# Patient Record
Sex: FEMALE | Race: BLACK OR AFRICAN AMERICAN | Employment: FULL TIME | URBAN - METROPOLITAN AREA
[De-identification: names, ages, dates, MRNs, and addresses within clinical notes are randomized per-mention and may not be internally consistent; named-entity substitution may affect disease eponyms.]

---

## 2018-05-23 ENCOUNTER — HOSPITAL ENCOUNTER (OUTPATIENT)
Dept: CT IMAGING | Age: 43
Discharge: HOME | End: 2018-05-23
Attending: FAMILY MEDICINE
Payer: COMMERCIAL

## 2018-05-23 DIAGNOSIS — R10.9 STOMACH ACHE: ICD-10-CM

## 2018-05-23 PROCEDURE — 74011636320 HC RX REV CODE- 636/320

## 2018-05-23 PROCEDURE — 74176 CT ABD & PELVIS W/O CONTRAST: CPT

## 2018-05-23 RX ADMIN — IOPAMIDOL 94 ML: 755 INJECTION, SOLUTION INTRAVENOUS at 13:13

## 2018-08-01 ENCOUNTER — HOSPITAL ENCOUNTER (OUTPATIENT)
Dept: MAMMOGRAPHY | Age: 43
Discharge: HOME | End: 2018-08-01
Attending: OBSTETRICS & GYNECOLOGY
Payer: COMMERCIAL

## 2018-08-01 DIAGNOSIS — Z12.31 VISIT FOR SCREENING MAMMOGRAM: ICD-10-CM

## 2018-08-01 PROCEDURE — 77067 SCR MAMMO BI INCL CAD: CPT

## 2018-08-24 ENCOUNTER — HOSPITAL ENCOUNTER (OUTPATIENT)
Dept: MAMMOGRAPHY | Age: 43
Discharge: HOME | End: 2018-08-24
Attending: OBSTETRICS & GYNECOLOGY
Payer: COMMERCIAL

## 2018-08-24 DIAGNOSIS — R92.8 ABNORMAL MAMMOGRAM: ICD-10-CM

## 2018-08-24 PROCEDURE — 76642 ULTRASOUND BREAST LIMITED: CPT

## 2018-08-24 PROCEDURE — 77065 DX MAMMO INCL CAD UNI: CPT

## 2019-03-06 ENCOUNTER — OFFICE VISIT (OUTPATIENT)
Dept: FAMILY MEDICINE CLINIC | Age: 44
End: 2019-03-06

## 2019-03-06 VITALS
DIASTOLIC BLOOD PRESSURE: 74 MMHG | RESPIRATION RATE: 16 BRPM | SYSTOLIC BLOOD PRESSURE: 118 MMHG | WEIGHT: 187 LBS | HEART RATE: 78 BPM | TEMPERATURE: 97.9 F | HEIGHT: 62 IN | OXYGEN SATURATION: 99 % | BODY MASS INDEX: 34.41 KG/M2

## 2019-03-06 DIAGNOSIS — Z00.00 ROUTINE GENERAL MEDICAL EXAMINATION AT A HEALTH CARE FACILITY: Primary | ICD-10-CM

## 2019-03-06 DIAGNOSIS — S83.91XA SPRAIN OF RIGHT KNEE, UNSPECIFIED LIGAMENT, INITIAL ENCOUNTER: ICD-10-CM

## 2019-03-06 DIAGNOSIS — E66.9 OBESITY (BMI 30.0-34.9): ICD-10-CM

## 2019-03-06 RX ORDER — TRAMADOL HYDROCHLORIDE 50 MG/1
50 TABLET ORAL
COMMUNITY
Start: 2019-03-05 | End: 2019-03-06 | Stop reason: ALTCHOICE

## 2019-03-06 RX ORDER — TRIAMCINOLONE ACETONIDE 1 MG/G
CREAM TOPICAL
COMMUNITY
Start: 2019-02-24 | End: 2019-08-21

## 2019-03-06 RX ORDER — DICLOFENAC SODIUM 50 MG/1
50 TABLET, DELAYED RELEASE ORAL 2 TIMES DAILY
COMMUNITY
Start: 2019-02-14 | End: 2019-03-06 | Stop reason: ALTCHOICE

## 2019-03-06 NOTE — ASSESSMENT & PLAN NOTE
Gained after knee injury. Discussed abnormal weight, ideal BMI and importance of diet and exercise to loose weight. Referral for exercise program was offered. Printed information about diet and lifestyle modification provided.

## 2019-03-06 NOTE — PROGRESS NOTES
HISTORY OF PRESENT ILLNESS  Hany Cox is a 38 y.o. female. She is a new patient and is here to establish care. Previous followed by [facility]. The following sections were reviewed & updated as appropriate: PMH, PL, PSH, and SH. Following ortho for her recent fall and knee strain. HPI  Health Maintenance  Immunizations:    Influenza: up to date. Tetanus: up to date. Gardasil: n/a. Cancer screening:    Cervical: reviewed guidelines, UTD, done by OBGYN, records requested. Breast: reviewed guidelines, reviewed SBE with her, UTD, done by OBGYN, records requested    , 2  5 and 11 y/o    Patient Care Team:  Summer Chirinos MD as PCP - General (Family Practice)  Mendel Bhat., MD (Obstetrics & Gynecology)       The following sections were reviewed & updated as appropriate: PMH, PSH, FH, and SH. Patient Active Problem List   Diagnosis Code    Obesity (BMI 30.0-34. 9) E66.9          Prior to Admission medications    Medication Sig Start Date End Date Taking? Authorizing Provider   triamcinolone acetonide (KENALOG) 0.1 % topical cream  19  Yes Provider, Historical   om 3/E/linol/ala/oleic/gla/lip (OMEGA 3-6-9 PO) Take  by mouth. Yes Provider, Historical          Allergies   Allergen Reactions    Quine Itching        Review of Systems   Constitutional: Negative for chills, fever and malaise/fatigue. HENT: Negative for congestion, ear pain, sore throat and tinnitus. Eyes: Negative for blurred vision, double vision, pain and discharge. Respiratory: Negative for cough, shortness of breath and wheezing. Cardiovascular: Negative for chest pain, palpitations and leg swelling. Gastrointestinal: Negative for abdominal pain, blood in stool, constipation, diarrhea, nausea and vomiting. Genitourinary: Negative for dysuria, frequency, hematuria and urgency. Musculoskeletal: Negative for back pain, joint pain and myalgias.         Knee pain   Skin: Negative for rash. Neurological: Negative for dizziness, tremors, seizures and headaches. Endo/Heme/Allergies: Negative for polydipsia. Does not bruise/bleed easily. Psychiatric/Behavioral: Negative for depression and substance abuse. The patient is not nervous/anxious. Physical Exam   Constitutional: She is oriented to person, place, and time. She appears well-developed and well-nourished. HENT:   Head: Normocephalic and atraumatic. Right Ear: External ear normal.   Left Ear: External ear normal.   Nose: Nose normal.   Mouth/Throat: Oropharynx is clear and moist.   Eyes: Conjunctivae and EOM are normal. Pupils are equal, round, and reactive to light. No scleral icterus. Neck: Normal range of motion. Neck supple. No JVD present. No thyromegaly present. Cardiovascular: Normal rate, regular rhythm, normal heart sounds and intact distal pulses. Exam reveals no gallop and no friction rub. No murmur heard. Pulmonary/Chest: Effort normal and breath sounds normal. She has no wheezes. She has no rales. She exhibits no tenderness. Right breast exhibits no inverted nipple, no mass, no nipple discharge, no skin change and no tenderness. Left breast exhibits no inverted nipple, no mass, no nipple discharge, no skin change and no tenderness. Breasts are symmetrical.   Abdominal: Soft. Bowel sounds are normal. She exhibits no distension and no mass. There is no tenderness. Musculoskeletal: Normal range of motion. She exhibits no edema or tenderness. Right knee in brace   Lymphadenopathy:     She has no cervical adenopathy. Neurological: She is alert and oriented to person, place, and time. She has normal reflexes. No cranial nerve deficit. Skin: Skin is warm and dry. No rash noted. Psychiatric: She has a normal mood and affect. Her behavior is normal. Judgment and thought content normal.   Nursing note and vitals reviewed. ASSESSMENT and PLAN  Diagnoses and all orders for this visit:    1.  Routine general medical examination at a health care facility  -     CBC WITH AUTOMATED DIFF  -     METABOLIC PANEL, COMPREHENSIVE  -     HEMOGLOBIN A1C WITH EAG  -     TSH REFLEX TO T4  -     URINALYSIS W/ RFLX MICROSCOPIC  -     LIPID PANEL    2. Sprain of right knee, unspecified ligament, initial encounter    3. Obesity (BMI 30.0-34. 9)  Assessment & Plan:  Gained after knee injury. Discussed abnormal weight, ideal BMI and importance of diet and exercise to loose weight. Referral for exercise program was offered. Printed information about diet and lifestyle modification provided. Discussed lifestyle issues and health guidance given  Patient was given an after visit summary which includes diagnoses, vital signs, current medications, instructions and references & authorized prescriptions . Results of labs will be conveyed to patient, once available. Pt verbalized instructions I provided and expressed understanding of discussion that was held today. Follow-up Disposition:  Return if symptoms worsen or fail to improve.

## 2019-03-06 NOTE — PATIENT INSTRUCTIONS

## 2019-03-26 LAB
ALBUMIN SERPL-MCNC: 4.5 G/DL (ref 3.5–5.5)
ALBUMIN/GLOB SERPL: 2 {RATIO} (ref 1.2–2.2)
ALP SERPL-CCNC: 38 IU/L (ref 39–117)
ALT SERPL-CCNC: 23 IU/L (ref 0–32)
APPEARANCE UR: CLEAR
AST SERPL-CCNC: 28 IU/L (ref 0–40)
BASOPHILS # BLD AUTO: 0 X10E3/UL (ref 0–0.2)
BASOPHILS NFR BLD AUTO: 1 %
BILIRUB SERPL-MCNC: 0.5 MG/DL (ref 0–1.2)
BILIRUB UR QL STRIP: NEGATIVE
BUN SERPL-MCNC: 14 MG/DL (ref 6–24)
BUN/CREAT SERPL: 19 (ref 9–23)
CALCIUM SERPL-MCNC: 9.3 MG/DL (ref 8.7–10.2)
CHLORIDE SERPL-SCNC: 101 MMOL/L (ref 96–106)
CHOLEST SERPL-MCNC: 185 MG/DL (ref 100–199)
CO2 SERPL-SCNC: 26 MMOL/L (ref 20–29)
COLOR UR: YELLOW
CREAT SERPL-MCNC: 0.74 MG/DL (ref 0.57–1)
EOSINOPHIL # BLD AUTO: 0.1 X10E3/UL (ref 0–0.4)
EOSINOPHIL NFR BLD AUTO: 2 %
ERYTHROCYTE [DISTWIDTH] IN BLOOD BY AUTOMATED COUNT: 13.7 % (ref 12.3–15.4)
EST. AVERAGE GLUCOSE BLD GHB EST-MCNC: 105 MG/DL
GLOBULIN SER CALC-MCNC: 2.2 G/DL (ref 1.5–4.5)
GLUCOSE SERPL-MCNC: 78 MG/DL (ref 65–99)
GLUCOSE UR QL: NEGATIVE
HBA1C MFR BLD: 5.3 % (ref 4.8–5.6)
HCT VFR BLD AUTO: 38.8 % (ref 34–46.6)
HDLC SERPL-MCNC: 68 MG/DL
HGB BLD-MCNC: 12.7 G/DL (ref 11.1–15.9)
HGB UR QL STRIP: NEGATIVE
IMM GRANULOCYTES # BLD AUTO: 0 X10E3/UL (ref 0–0.1)
IMM GRANULOCYTES NFR BLD AUTO: 0 %
INTERPRETATION, 910389: NORMAL
KETONES UR QL STRIP: NEGATIVE
LDLC SERPL CALC-MCNC: 111 MG/DL (ref 0–99)
LEUKOCYTE ESTERASE UR QL STRIP: NEGATIVE
LYMPHOCYTES # BLD AUTO: 1.8 X10E3/UL (ref 0.7–3.1)
LYMPHOCYTES NFR BLD AUTO: 45 %
MCH RBC QN AUTO: 27.9 PG (ref 26.6–33)
MCHC RBC AUTO-ENTMCNC: 32.7 G/DL (ref 31.5–35.7)
MCV RBC AUTO: 85 FL (ref 79–97)
MICRO URNS: NORMAL
MONOCYTES # BLD AUTO: 0.2 X10E3/UL (ref 0.1–0.9)
MONOCYTES NFR BLD AUTO: 6 %
NEUTROPHILS # BLD AUTO: 1.9 X10E3/UL (ref 1.4–7)
NEUTROPHILS NFR BLD AUTO: 46 %
NITRITE UR QL STRIP: NEGATIVE
PH UR STRIP: 7 [PH] (ref 5–7.5)
PLATELET # BLD AUTO: 234 X10E3/UL (ref 150–379)
POTASSIUM SERPL-SCNC: 4.7 MMOL/L (ref 3.5–5.2)
PROT SERPL-MCNC: 6.7 G/DL (ref 6–8.5)
PROT UR QL STRIP: NEGATIVE
RBC # BLD AUTO: 4.55 X10E6/UL (ref 3.77–5.28)
SODIUM SERPL-SCNC: 140 MMOL/L (ref 134–144)
SP GR UR: 1.01 (ref 1–1.03)
TRIGL SERPL-MCNC: 28 MG/DL (ref 0–149)
TSH SERPL DL<=0.005 MIU/L-ACNC: 1.12 UIU/ML (ref 0.45–4.5)
UROBILINOGEN UR STRIP-MCNC: 0.2 MG/DL (ref 0.2–1)
VLDLC SERPL CALC-MCNC: 6 MG/DL (ref 5–40)
WBC # BLD AUTO: 4 X10E3/UL (ref 3.4–10.8)

## 2019-03-26 NOTE — PROGRESS NOTES
Please inform patient and send letter Blood count,kidney,liver,thyroid,cholesterol,diabetes screen,urine all results very normal 
thanks

## 2019-07-15 ENCOUNTER — OFFICE VISIT (OUTPATIENT)
Dept: FAMILY MEDICINE CLINIC | Age: 44
End: 2019-07-15

## 2019-07-15 VITALS
SYSTOLIC BLOOD PRESSURE: 110 MMHG | BODY MASS INDEX: 32.47 KG/M2 | HEART RATE: 82 BPM | WEIGHT: 172 LBS | HEIGHT: 61 IN | OXYGEN SATURATION: 99 % | RESPIRATION RATE: 16 BRPM | DIASTOLIC BLOOD PRESSURE: 61 MMHG | TEMPERATURE: 97.5 F

## 2019-07-15 DIAGNOSIS — K04.7 TOOTH ABSCESS: ICD-10-CM

## 2019-07-15 DIAGNOSIS — S83.511D SPRAIN OF ANTERIOR CRUCIATE LIGAMENT OF RIGHT KNEE, SUBSEQUENT ENCOUNTER: Primary | ICD-10-CM

## 2019-07-15 DIAGNOSIS — S83.242D ACUTE MEDIAL MENISCAL TEAR, LEFT, SUBSEQUENT ENCOUNTER: ICD-10-CM

## 2019-07-15 RX ORDER — TRIAMCINOLONE ACETONIDE 1 MG/G
CREAM TOPICAL
COMMUNITY
Start: 2019-01-28 | End: 2019-07-15 | Stop reason: SDUPTHER

## 2019-07-15 RX ORDER — PHENTERMINE HYDROCHLORIDE 30 MG/1
CAPSULE ORAL
Refills: 0 | COMMUNITY
Start: 2019-07-10 | End: 2020-06-24 | Stop reason: SDUPTHER

## 2019-07-15 RX ORDER — AMOXICILLIN AND CLAVULANATE POTASSIUM 500; 125 MG/1; MG/1
1 TABLET, FILM COATED ORAL 2 TIMES DAILY
Qty: 20 TAB | Refills: 0 | Status: SHIPPED | OUTPATIENT
Start: 2019-07-15 | End: 2019-07-25

## 2019-07-15 RX ORDER — DICLOFENAC SODIUM 75 MG/1
75 TABLET, DELAYED RELEASE ORAL 2 TIMES DAILY
Qty: 30 TAB | Refills: 2 | Status: SHIPPED | OUTPATIENT
Start: 2019-07-15 | End: 2019-08-21

## 2019-07-15 RX ORDER — IBUPROFEN 800 MG/1
800 TABLET ORAL
COMMUNITY
Start: 2012-11-07 | End: 2019-07-15 | Stop reason: ALTCHOICE

## 2019-07-15 NOTE — LETTER
NOTIFICATION RETURN TO WORK  
 
7/15/2019 1:45 PM 
 
Ms. Tom Weldon 
[address] [place] [id number] To Whom It May Concern: 
 
Tom Weldon is currently under the care of [facility]UR[facility] Zuleta. She will return to work on: 07/19/2019 If there are questions or concerns please have the patient contact our office. Sincerely, Ulisses Vázquez MD

## 2019-07-15 NOTE — PATIENT INSTRUCTIONS
Meniscus Tear: Exercises  Your Care Instructions  Here are some examples of typical rehabilitation exercises for your condition. Start each exercise slowly. Ease off the exercise if you start to have pain. Your doctor or physical therapist will tell you when you can start these exercises and which ones will work best for you. How to do the exercises  Calf wall stretch    1. Stand facing a wall with your hands on the wall at about eye level. Put your affected leg about a step behind your other leg. 2. Keeping your back leg straight and your back heel on the floor, bend your front knee and gently bring your hip and chest toward the wall until you feel a stretch in the calf of your back leg. 3. Hold the stretch for at least 15 to 30 seconds. 4. Repeat 2 to 4 times. 5. Repeat steps 1 through 4, but this time keep your back knee bent. Hamstring wall stretch    1. Lie on your back in a doorway, with your good leg through the open door. 2. Slide your affected leg up the wall to straighten your knee. You should feel a gentle stretch down the back of your leg. 1. Do not arch your back. 2. Do not bend either knee. 3. Keep one heel touching the floor and the other heel touching the wall. Do not point your toes. 3. Hold the stretch for at least 1 minute. Then over time, try to lengthen the time you hold the stretch to as long as 6 minutes. 4. Repeat 2 to 4 times. 5. If you do not have a place to do this exercise in a doorway, there is another way to do it:  6. Lie on your back, and bend your affected leg. 7. Loop a towel under the ball and toes of that foot, and hold the ends of the towel in your hands. 8. Straighten your knee, and slowly pull back on the towel. You should feel a gentle stretch down the back of your leg. 9. Hold the stretch for at least 15 to 30 seconds. Or even better, hold the stretch for 1 minute if you can. 10. Repeat 2 to 4 times. Quad sets    1.  Sit with your leg straight and supported on the floor or a firm bed. (If you feel discomfort in the front or back of your knee, place a small towel roll under your knee.)  2. Tighten the muscles on top of your thigh by pressing the back of your knee flat down to the floor. (If you feel discomfort under your kneecap, place a small towel roll under your knee.)  3. Hold for about 6 seconds, then rest up to 10 seconds. 4. Do 8 to 12 repetitions several times a day. Straight-leg raises to the front    1. Lie on your back with your good knee bent so that your foot rests flat on the floor. Your injured leg should be straight. Make sure that your low back has a normal curve. You should be able to slip your flat hand in between the floor and the small of your back, with your palm touching the floor and your back touching the back of your hand. 2. Tighten the thigh muscles in the injured leg by pressing the back of your knee flat down to the floor. Hold your knee straight. 3. Keeping the thigh muscles tight, lift your injured leg up so that your heel is about 12 inches off the floor. Hold for 5 seconds and then lower slowly. 4. Do 8 to 12 repetitions. Straight-leg raises to the back    1. Lie on your stomach, and lift your leg straight up behind you (toward the ceiling). 2. Lift your toes about 6 inches off the floor, hold for about 6 seconds, then lower slowly. 3. Do 8 to 12 repetitions. Hamstring curls    1. Lie on your stomach with your knees straight. If your kneecap is uncomfortable, roll up a washcloth and put it under your leg just above your kneecap. 2. Lift the foot of your injured leg by bending the knee so that you bring the foot up toward your buttock. If this motion hurts, try it without bending your knee quite as far. This may help you avoid any painful motion. 3. Slowly lower your leg back to the floor. 4. Do 8 to 12 repetitions.   5. With permission from your doctor or physical therapist, you may also want to add a cuff weight to your ankle (not more than 5 pounds). With weight, you do not have to lift your leg more than 12 inches to get a hamstring workout. Wall slide with ball squeeze    1. Stand with your back against a wall and with your feet about shoulder-width apart. Your feet should be about 12 inches away from the wall. 2. Put a ball about the size of a soccer ball between your knees. Then slowly slide down the wall until your knees are bent about 20 to 30 degrees. 3. Tighten your thigh muscles by squeezing the ball between your knees. Hold that position for about 10 seconds, then stop squeezing. Rest for up to 10 seconds between repetitions. 4. Repeat 8 to 12 times. Heel raises    1. Stand with your feet a few inches apart, with your hands lightly resting on a counter or chair in front of you. 2. Slowly raise your heels off the floor while keeping your knees straight. 3. Hold for about 6 seconds, then slowly lower your heels to the floor. 4. Do 8 to 12 repetitions several times during the day. Heel dig bridging    1. Lie on your back with both knees bent and your ankles bent so that only your heels are digging into the floor. Your knees should be bent about 90 degrees. 2. Then push your heels into the floor, squeeze your buttocks, and lift your hips off the floor until your shoulders, hips, and knees are all in a straight line. 3. Hold for about 6 seconds as you continue to breathe normally, and then slowly lower your hips back down to the floor and rest for up to 10 seconds. 4. Do 8 to 12 repetitions. Shallow standing knee bends    1. Stand with your hands lightly resting on a counter or chair in front of you. Put your feet shoulder-width apart. 2. Slowly bend your knees so that you squat down like you are going to sit in a chair. Make sure your knees do not go in front of your toes. 3. Lower yourself about 6 inches. Your heels should remain on the floor at all times.   4. Rise slowly to a standing position. Follow-up care is a key part of your treatment and safety. Be sure to make and go to all appointments, and call your doctor if you are having problems. It's also a good idea to know your test results and keep a list of the medicines you take. Where can you learn more? Go to http://daniela-monie.info/. Enter C183 in the search box to learn more about \"Meniscus Tear: Exercises. \"  Current as of: September 20, 2018  Content Version: 11.9  © 0403-2279 [employer], [employer]. Care instructions adapted under license by [employer] (which disclaims liability or warranty for this information). If you have questions about a medical condition or this instruction, always ask your healthcare professional. [address] any warranty or liability for your use of this information.

## 2019-07-15 NOTE — PROGRESS NOTES
Chief Complaint   Patient presents with    Knee Pain     Patient is experiencing right knee pain for 1 week. 1. Have you been to the ER, urgent care clinic since your last visit? Hospitalized since your last visit? No    2. Have you seen or consulted any other health care providers outside of the [address] [place] [address] since your last visit? Include any pap smears or colon screening.  No

## 2019-07-15 NOTE — PROGRESS NOTES
HISTORY OF PRESENT ILLNESS  Hany Longoria is a 38 y.o. female. Seen for right knee pain and swelling for a week and also left sided upper tooth pain. HPI  Knee Pain  Patient complains of right knee pain and knee swelling. Onset of the symptoms was several months ago. Inciting event: injured while had a fall on stairs. Current symptoms include pain location: right sided: anterior, lateral, medial, severity of pain = fairly severe, swelling: moderate, knee gives out, knee locks and crepitus sensation. Associated symptoms: none. Aggravating symptoms: going up and down stairs, standing, walking, rising after sitting, any weight bearing. Patient's overall course: gradually worsening and course of pain: gradually worsening Patient has had prior knee problems. Patient denies fever. Evaluation to date: MRI: abnormal: showing tear of medial and lateral meniscus and sprain of ACL, Ortho consult: records reviewed from chart summary. Treatment to date: prescription NSAIDs per med orders: somewhat effective  PT: somewhat effective. Here is chart summary from her visits with Orthopedic  Dr Irene Weinstein 02/25/2019, diagnosed with ACL strain. Started on PT  Dr Laura Caraballo, 04/01/2019 Medial meniscal tear and ACL sprain. Had MRI of knee which showed tear of medial and lateral meniscus and ACl sprain ( had cortisone shot and arthrocentesis). Was recommended for arthroscopy surgery,and patient declined. Tooth Pain:  Also concerned about pain of left upper molar tooth. She had piece of chicken stuck and tried to remove with pin. Injured her gum and since has throbbing pain on left side upper jaw. Review of Systems   Constitutional: Negative for chills, fever and malaise/fatigue. HENT: Negative for congestion, ear pain, sore throat and tinnitus. Right upper tooth pain   Eyes: Negative for blurred vision, double vision, pain and discharge. Respiratory: Negative for cough, shortness of breath and wheezing. Cardiovascular: Negative for chest pain, palpitations and leg swelling. Gastrointestinal: Negative for abdominal pain, blood in stool, constipation, diarrhea, nausea and vomiting. Genitourinary: Negative for dysuria, frequency, hematuria and urgency. Musculoskeletal: Negative for back pain, joint pain and myalgias. Right knee pain   Skin: Negative for rash. Neurological: Negative for dizziness, tremors, seizures and headaches. Endo/Heme/Allergies: Negative for polydipsia. Does not bruise/bleed easily. Psychiatric/Behavioral: Negative for depression and substance abuse. The patient is not nervous/anxious. Physical Exam   Constitutional: She is oriented to person, place, and time. She appears well-developed and well-nourished. HENT:   Head: Normocephalic and atraumatic. Right Ear: External ear normal.   Mouth/Throat: Oropharynx is clear and moist. No oropharyngeal exudate. Eyes: Pupils are equal, round, and reactive to light. Conjunctivae and EOM are normal. No scleral icterus. Neck: Normal range of motion. Neck supple. No JVD present. No thyromegaly present. Cardiovascular: Normal rate, regular rhythm, normal heart sounds and intact distal pulses. No murmur heard. Pulmonary/Chest: Effort normal and breath sounds normal. She has no wheezes. Abdominal: Soft. Bowel sounds are normal. She exhibits no distension and no mass. Musculoskeletal: She exhibits no edema. Right knee: She exhibits decreased range of motion and swelling. Tenderness found. Medial joint line, lateral joint line, MCL and LCL tenderness noted. Lymphadenopathy:     She has no cervical adenopathy. Neurological: She is alert and oriented to person, place, and time. She has normal reflexes. No cranial nerve deficit. Skin: Skin is warm and dry. No rash noted. She is not diaphoretic. Psychiatric: She has a normal mood and affect. Nursing note and vitals reviewed.       ASSESSMENT and PLAN  Diagnoses and all orders for this visit:    1. Sprain of anterior cruciate ligament of right knee, subsequent encounter  -     diclofenac EC (VOLTAREN) 75 mg EC tablet; Take 1 Tab by mouth two (2) times a day. 2. Acute medial meniscal tear, left, subsequent encounter  -     diclofenac EC (VOLTAREN) 75 mg EC tablet; Take 1 Tab by mouth two (2) times a day. 3. Tooth abscess  -     amoxicillin-clavulanate (AUGMENTIN) 500-125 mg per tablet; Take 1 Tab by mouth two (2) times a day for 10 days. -     diclofenac EC (VOLTAREN) 75 mg EC tablet; Take 1 Tab by mouth two (2) times a day. Discussed rehab exercise and to proceed with surgery if no improvement with rest, support and rehab exercise  Discussed lifestyle issues and health guidance given  Patient was given an after visit summary which includes diagnoses, vital signs, current medications, instructions and references & authorized prescriptions . Results of labs will be conveyed to patient, once available. Pt verbalized instructions I provided and expressed understanding of discussion that was held today.

## 2019-08-21 ENCOUNTER — APPOINTMENT (OUTPATIENT)
Dept: GENERAL RADIOLOGY | Age: 44
End: 2019-08-21
Attending: PHYSICIAN ASSISTANT
Payer: COMMERCIAL

## 2019-08-21 ENCOUNTER — APPOINTMENT (OUTPATIENT)
Dept: ULTRASOUND IMAGING | Age: 44
End: 2019-08-21
Attending: PHYSICIAN ASSISTANT
Payer: COMMERCIAL

## 2019-08-21 ENCOUNTER — HOSPITAL ENCOUNTER (EMERGENCY)
Age: 44
Discharge: HOME | End: 2019-08-21
Attending: EMERGENCY MEDICINE
Payer: COMMERCIAL

## 2019-08-21 VITALS
SYSTOLIC BLOOD PRESSURE: 124 MMHG | HEART RATE: 76 BPM | DIASTOLIC BLOOD PRESSURE: 73 MMHG | TEMPERATURE: 98.3 F | RESPIRATION RATE: 15 BRPM | WEIGHT: 180.12 LBS | BODY MASS INDEX: 34.03 KG/M2 | OXYGEN SATURATION: 99 %

## 2019-08-21 DIAGNOSIS — M54.16 LUMBAR RADICULOPATHY: Primary | ICD-10-CM

## 2019-08-21 PROCEDURE — 96372 THER/PROPH/DIAG INJ SC/IM: CPT

## 2019-08-21 PROCEDURE — 99283 EMERGENCY DEPT VISIT LOW MDM: CPT

## 2019-08-21 PROCEDURE — 74011250636 HC RX REV CODE- 250/636: Performed by: PHYSICIAN ASSISTANT

## 2019-08-21 PROCEDURE — 72100 X-RAY EXAM L-S SPINE 2/3 VWS: CPT

## 2019-08-21 PROCEDURE — 93971 EXTREMITY STUDY: CPT

## 2019-08-21 RX ORDER — IBUPROFEN 400 MG/1
400 TABLET ORAL
Qty: 20 TAB | Refills: 0 | Status: SHIPPED | OUTPATIENT
Start: 2019-08-21 | End: 2019-09-03 | Stop reason: ALTCHOICE

## 2019-08-21 RX ORDER — METHOCARBAMOL 500 MG/1
500 TABLET, FILM COATED ORAL 4 TIMES DAILY
Qty: 28 TAB | Refills: 0 | Status: SHIPPED | OUTPATIENT
Start: 2019-08-21 | End: 2019-08-28

## 2019-08-21 RX ORDER — KETOROLAC TROMETHAMINE 30 MG/ML
30 INJECTION, SOLUTION INTRAMUSCULAR; INTRAVENOUS
Status: COMPLETED | OUTPATIENT
Start: 2019-08-21 | End: 2019-08-21

## 2019-08-21 RX ORDER — METHOCARBAMOL 500 MG/1
500 TABLET, FILM COATED ORAL 4 TIMES DAILY
Qty: 28 TAB | Refills: 0 | Status: SHIPPED | OUTPATIENT
Start: 2019-08-21 | End: 2019-08-21

## 2019-08-21 RX ADMIN — KETOROLAC TROMETHAMINE 30 MG: 30 INJECTION, SOLUTION INTRAMUSCULAR at 19:35

## 2019-08-21 NOTE — ED TRIAGE NOTES
Patient developed left jeffrey and entire left leg pain one week ago. Has been treated for right leg pain last year and had extensive PT. Does not recall injured the left side this week. Was seen at [facility] and sent here for further evaluation.

## 2019-08-22 NOTE — ED PROVIDER NOTES
38 y.o. female with past medical history significant for chronic right lower extremity pain and right hip pain presents with complaints of as [facility] out DVT of the left lower extremity. The patient states that she recently returned from a trip to [facility] and over the last 48 hours she has had left lower leg pain. Patient denies taking anything over-the-counter for her symptoms. She denies any injury or trauma to the leg. She denies any redness warmth or signs of infection. She rates her discomfort as a 7 out of 10 in severity. She specifically denies any pleuritic chest pain hemoptysis shortness of breath history of cancer or history of blood clots. Pt denies loss of bowel/bladder continence, urinary retention, perineal numbness, fever, weight loss, hx of IV drug use. There are no other acute medical complaints at this time. PCP: MD Sergey Maya PA-C           History reviewed. No pertinent past medical history.     Past Surgical History:   Procedure Laterality Date    HX APPENDECTOMY      HX OTHER SURGICAL      appendectomy         Family History:   Problem Relation Age of Onset    Hypertension Mother     Diabetes Father     Hypertension Father     Arthritis-rheumatoid Maternal Grandmother     Arthritis-rheumatoid Paternal Grandmother        Social History     Socioeconomic History    Marital status:      Spouse name: Not on file    Number of children: Not on file    Years of education: Not on file    Highest education level: Not on file   Occupational History    Not on file   Social Needs    Financial resource strain: Not on file    Food insecurity:     Worry: Not on file     Inability: Not on file    Transportation needs:     Medical: Not on file     Non-medical: Not on file   Tobacco Use    Smoking status: Never Smoker    Smokeless tobacco: Never Used   Substance and Sexual Activity    Alcohol use: Yes     Comment: RARE    Drug use: No    Sexual activity: Yes     Partners: Male     Birth control/protection: None   Lifestyle    Physical activity:     Days per week: Not on file     Minutes per session: Not on file    Stress: Not on file   Relationships    Social connections:     Talks on phone: Not on file     Gets together: Not on file     Attends Scientologist service: Not on file     Active member of club or organization: Not on file     Attends meetings of clubs or organizations: Not on file     Relationship status: Not on file    Intimate partner violence:     Fear of current or ex partner: Not on file     Emotionally abused: Not on file     Physically abused: Not on file     Forced sexual activity: Not on file   Other Topics Concern    Not on file   Social History Narrative    ** Merged History Encounter **              ALLERGIES: Quine    Review of Systems   Constitutional: Negative for chills, diaphoresis and fever. HENT: Negative for congestion, postnasal drip, rhinorrhea and sore throat. Eyes: Negative for photophobia, discharge, redness and visual disturbance. Respiratory: Negative for cough, chest tightness, shortness of breath and wheezing. Cardiovascular: Negative for chest pain, palpitations and leg swelling. Gastrointestinal: Negative for abdominal distention, abdominal pain, blood in stool, constipation, diarrhea, nausea and vomiting. Genitourinary: Negative for difficulty urinating, dysuria, frequency, hematuria and urgency. Musculoskeletal: Positive for arthralgias and myalgias. Negative for back pain and joint swelling. Skin: Negative for color change and rash. Neurological: Negative for dizziness, speech difficulty, weakness, light-headedness, numbness and headaches. Psychiatric/Behavioral: Negative for confusion. The patient is not nervous/anxious. All other systems reviewed and are negative.       Vitals:    08/21/19 1846   BP: 117/61   Pulse: 85   Resp: 16   Temp: 98.3 °F (36.8 °C) SpO2: 97%   Weight: 81.7 kg (180 lb 1.9 oz)            Physical Exam   Constitutional: She is oriented to person, place, and time. She appears well-developed and well-nourished. No distress. HENT:   Head: Normocephalic and atraumatic. Head is without raccoon's eyes, without Alamo's sign and without laceration. Right Ear: Hearing, tympanic membrane, external ear and ear canal normal. No foreign bodies. Tympanic membrane is not bulging. No hemotympanum. Left Ear: Hearing, tympanic membrane, external ear and ear canal normal. No foreign bodies. Tympanic membrane is not bulging. No hemotympanum. Nose: Nose normal. No mucosal edema or rhinorrhea. Right sinus exhibits no maxillary sinus tenderness and no frontal sinus tenderness. Left sinus exhibits no maxillary sinus tenderness and no frontal sinus tenderness. Mouth/Throat: Uvula is midline, oropharynx is clear and moist and mucous membranes are normal. No tonsillar abscesses. Eyes: Pupils are equal, round, and reactive to light. Conjunctivae and EOM are normal. Right eye exhibits no discharge. Left eye exhibits no discharge. Neck: Normal range of motion. Neck supple. Cardiovascular: Normal rate, regular rhythm and normal heart sounds. Exam reveals no gallop and no friction rub. No murmur heard. Regular rate and rhythm. No murmurs, gallops, rubs, or clicks. Pulmonary/Chest: Effort normal and breath sounds normal. No respiratory distress. She has no wheezes. She has no rales. No stridor or wheezes. No accessory muscle usage. No nasal flaring. Breath Sounds equal bilaterally. Abdominal: Soft. Bowel sounds are normal. She exhibits no distension. There is no tenderness. There is no rebound and no guarding. No abdominal Bruits. No pulsatile mass. No abdominal scars. Active bowel sounds. Musculoskeletal: Normal range of motion. She exhibits no edema, tenderness or deformity. Pt has FROM at the left knee joint.   No redness warmth or signs of infection. The pt is able to ambulate without difficulty. The pt is NVI. Neurological: She is alert and oriented to person, place, and time. Skin: She is not diaphoretic. Nursing note and vitals reviewed. MDM  Number of Diagnoses or Management Options  Diagnosis management comments: Pt afebrile and nontoxic appearing. Vitals, physical exam, and imaging studies all unremarkable. No signs of PE, DVT, dissection, pancreatitis, appendicitis, cholecystitis/cholagnitis, bowel obstruction/perforation, sepsis or any other acute life threatening condition. Will treat symptomatically and advise close follow up with family doctor.   Garrett Michele PA-C         Procedures

## 2019-08-22 NOTE — ED NOTES
The patient was discharged home by Tim Whaley in stable condition. The patient is alert and oriented, in no respiratory distress. The patient's diagnosis, condition and treatment were explained. The patient expressed understanding. A discharge plan has been developed. A  was not involved in the process. Aftercare instructions were given. Pt ambulatory out of the ED with family.

## 2019-08-22 NOTE — DISCHARGE INSTRUCTIONS
Patient Education        Back Pain: Care Instructions  Your Care Instructions    Back pain has many possible causes. It is often related to problems with muscles and ligaments of the back. It may also be related to problems with the nerves, discs, or bones of the back. Moving, lifting, standing, sitting, or sleeping in an awkward way can strain the back. Sometimes you don't notice the injury until later. Arthritis is another common cause of back pain. Although it may hurt a lot, back pain usually improves on its own within several weeks. Most people recover in 12 weeks or less. Using good home treatment and being careful not to stress your back can help you feel better sooner. Follow-up care is a key part of your treatment and safety. Be sure to make and go to all appointments, and call your doctor if you are having problems. It's also a good idea to know your test results and keep a list of the medicines you take. How can you care for yourself at home? · Sit or lie in positions that are most comfortable and reduce your pain. Try one of these positions when you lie down:  ? Lie on your back with your knees bent and supported by large pillows. ? Lie on the floor with your legs on the seat of a sofa or chair. ? Lie on your side with your knees and hips bent and a pillow between your legs. ? Lie on your stomach if it does not make pain worse. · Do not sit up in bed, and avoid soft couches and twisted positions. Bed rest can help relieve pain at first, but it delays healing. Avoid bed rest after the first day of back pain. · Change positions every 30 minutes. If you must sit for long periods of time, take breaks from sitting. Get up and walk around, or lie in a comfortable position. · Try using a heating pad on a low or medium setting for 15 to 20 minutes every 2 or 3 hours. Try a warm shower in place of one session with the heating pad. · You can also try an ice pack for 10 to 15 minutes every 2 to 3 hours. Put a thin cloth between the ice pack and your skin. · Take pain medicines exactly as directed. ? If the doctor gave you a prescription medicine for pain, take it as prescribed. ? If you are not taking a prescription pain medicine, ask your doctor if you can take an over-the-counter medicine. · Take short walks several times a day. You can start with 5 to 10 minutes, 3 or 4 times a day, and work up to longer walks. Walk on level surfaces and avoid hills and stairs until your back is better. · Return to work and other activities as soon as you can. Continued rest without activity is usually not good for your back. · To prevent future back pain, do exercises to stretch and strengthen your back and stomach. Learn how to use good posture, safe lifting techniques, and proper body mechanics. When should you call for help? Call your doctor now or seek immediate medical care if:    · You have new or worsening numbness in your legs.     · You have new or worsening weakness in your legs. (This could make it hard to stand up.)     · You lose control of your bladder or bowels.    Watch closely for changes in your health, and be sure to contact your doctor if:    · You have a fever, lose weight, or don't feel well.     · You do not get better as expected. Where can you learn more? Go to http://daniela-monie.info/. Enter R219 in the search box to learn more about \"Back Pain: Care Instructions. \"  Current as of: September 20, 2018  Content Version: 12.1  © 7452-0992 Healthwise, Incorporated. Care instructions adapted under license by [employer] (which disclaims liability or warranty for this information). If you have questions about a medical condition or this instruction, always ask your healthcare professional. [place] [address] any warranty or liability for your use of this information. We hope that we have addressed all of your medical concerns.  The examination and treatment you received in the Emergency Department were for an emergent problem and were not intended as complete care. It is important that you follow up with your healthcare provider(s) for ongoing care. If your symptoms worsen or do not improve as expected, and you are unable to reach your usual health care provider(s), you should return to the Emergency Department. Today's healthcare is undergoing tremendous change, and patient satisfaction surveys are one of the many tools to assess the quality of medical care. You may receive a survey from the [facility] regarding your experience in the Emergency Department. I hope that your experience has been completely positive, particularly the medical care that I provided. As such, please participate in the survey; anything less than excellent does not meet my expectations or intentions. [facility] and [facility] participate in nationally recognized quality of care measures. If your blood pressure is greater than 120/80, as reported below, we urge that you seek medical care to address the potential of high blood pressure, commonly known as hypertension. Hypertension can be hereditary or can be caused by certain medical conditions, pain, stress, or \"white coat syndrome. \"       Please make an appointment with your health care provider(s) for follow up of your Emergency Department visit. VITALS:   Patient Vitals for the past 8 hrs:   Temp Pulse Resp BP SpO2   08/21/19 1846 98.3 °F (36.8 °C) 85 16 117/61 97 %          Thank you for allowing us to provide you with medical care today. We realize that you have many choices for your emergency care needs. Please choose us in the future for any continued health care needs. Marcial Avery, [facility] Gerald Cardenas: [phone]            No results found for this or any previous visit (from the past 24 hour(s)). Xr Spine Lumb 2 Or 3 V    Result Date: 8/21/2019  Clinical Indication:  low back pain 3 views of the lumbar spine Comparison: None Findings: There is a slight sigmoid scoliosis. There is no evidence of fracture or dislocation. There is mild degenerative disc disease at L5-S1. Paraspinal soft tissues are unremarkable. Impression: 1. No acute osseous abnormality. 2. Mild degenerative disc disease at L5-S1.

## 2019-09-03 ENCOUNTER — OFFICE VISIT (OUTPATIENT)
Dept: FAMILY MEDICINE CLINIC | Age: 44
End: 2019-09-03

## 2019-09-03 VITALS
RESPIRATION RATE: 16 BRPM | SYSTOLIC BLOOD PRESSURE: 106 MMHG | DIASTOLIC BLOOD PRESSURE: 72 MMHG | TEMPERATURE: 98 F | HEART RATE: 86 BPM | WEIGHT: 180.4 LBS | BODY MASS INDEX: 34.06 KG/M2 | HEIGHT: 61 IN | OXYGEN SATURATION: 99 %

## 2019-09-03 DIAGNOSIS — M25.562 ACUTE PAIN OF LEFT KNEE: ICD-10-CM

## 2019-09-03 DIAGNOSIS — M54.16 LUMBAR RADICULOPATHY: Primary | ICD-10-CM

## 2019-09-03 DIAGNOSIS — E66.9 OBESITY (BMI 30.0-34.9): ICD-10-CM

## 2019-09-03 DIAGNOSIS — M54.42 ACUTE MIDLINE LOW BACK PAIN WITH LEFT-SIDED SCIATICA: ICD-10-CM

## 2019-09-03 RX ORDER — DIAZEPAM 5 MG/1
1 TABLET ORAL
Refills: 0 | COMMUNITY
Start: 2019-08-26 | End: 2019-09-03 | Stop reason: ALTCHOICE

## 2019-09-03 RX ORDER — LIDOCAINE 50 MG/G
1 PATCH TOPICAL EVERY 12 HOURS
COMMUNITY
End: 2020-07-27 | Stop reason: ALTCHOICE

## 2019-09-03 RX ORDER — DEXAMETHASONE 2 MG/1
TABLET ORAL
Qty: 18 TAB | Refills: 0 | Status: SHIPPED | OUTPATIENT
Start: 2019-09-03 | End: 2020-06-24 | Stop reason: ALTCHOICE

## 2019-09-03 RX ORDER — METHOCARBAMOL 750 MG/1
750 TABLET, FILM COATED ORAL 3 TIMES DAILY
Qty: 30 TAB | Refills: 0 | Status: SHIPPED | OUTPATIENT
Start: 2019-09-03 | End: 2020-06-24 | Stop reason: ALTCHOICE

## 2019-09-03 RX ORDER — DICLOFENAC SODIUM 75 MG/1
1 TABLET, DELAYED RELEASE ORAL DAILY
COMMUNITY
Start: 2019-09-01 | End: 2020-06-24 | Stop reason: ALTCHOICE

## 2019-09-03 NOTE — ASSESSMENT & PLAN NOTE
Worsening. Discussed abnormal weight, ideal BMI and importance of diet and exercise to loose weight. Referral for exercise program was offered. Printed information about diet and lifestyle modification provided.

## 2019-09-03 NOTE — PROGRESS NOTES
Chief Complaint   Patient presents with    Knee Pain    Back Pain       1. Have you been to the ER, urgent care clinic since your last visit? Hospitalized since your last visit? Yes When: 8/21/19 [facility] knee pain     2. Have you seen or consulted any other health care providers outside of the [address] [place] [address] since your last visit? Include any pap smears or colon screening.  No

## 2019-09-03 NOTE — PROGRESS NOTES
HISTORY OF PRESENT ILLNESS  Hany Stratton is a 38 y.o. female. seen for worsening lower back pain,radiating to left buttock and left leg and also left knee pain. Patient was seen in ER for same on 08/21/2019 after she visited [facility]    HPI  Left Back pain:  Richard Bautista is a 38 y.o. female who presents complaining of left side back pain and left hip pain that has been present for 2 weeks. She was seen in the Emergency department at [facility] on 08/21/2019 . She had an doppler to rule out DVT and advised that her symptoms were likely related her back. She describes her pain as a burning aching pain that radiates down the entire leg, but seems to be most severe in the left knee. She denies any specific injury or trauma. She denies associated numbness or paresthesias  Had Xray of lumbar spine on 08/21/2019 which showed   1. No acute osseous abnormality. 2. Mild degenerative disc disease at L5-S1. Seen by orthopedic specialist on 08/26/2019 and had MRI ordered. Which showed  1. No significant central spinal stenosis. 2. Chronic degenerative disc disease and facet uropathy at L4/L5 and  L5/S1. There is left lateral recess narrowing and mild left neural  foraminal narrowing at L4/L5. There is mild bilateral neural foraminal  narrowing L5/S1. There is slight impingement of the traversing left L5  nerve root in the left lateral recess at L4/L5. She has follow up appointment with specialist next week. Mean while her pain is getting worst and was seen in office for pain management. Review of Systems   Constitutional: Negative for chills, fever and malaise/fatigue. HENT: Negative for congestion, ear pain, sore throat and tinnitus. Eyes: Negative for blurred vision, double vision, pain and discharge. Respiratory: Negative for cough, shortness of breath and wheezing. Cardiovascular: Negative for chest pain, palpitations and leg swelling.    Gastrointestinal: Negative for abdominal pain, blood in stool, constipation, diarrhea, nausea and vomiting. Genitourinary: Negative for dysuria, frequency, hematuria and urgency. Musculoskeletal: Positive for back pain. Negative for joint pain and myalgias. Left knee pain   Skin: Negative for rash. Neurological: Negative for dizziness, tremors, seizures and headaches. Endo/Heme/Allergies: Negative for polydipsia. Does not bruise/bleed easily. Psychiatric/Behavioral: Negative for depression and substance abuse. The patient is not nervous/anxious. Physical Exam   Constitutional: She is oriented to person, place, and time. She appears well-developed and well-nourished. HENT:   Head: Normocephalic and atraumatic. Right Ear: External ear normal.   Mouth/Throat: Oropharynx is clear and moist. No oropharyngeal exudate. Eyes: Pupils are equal, round, and reactive to light. Conjunctivae and EOM are normal. No scleral icterus. Neck: Normal range of motion. Neck supple. No JVD present. No thyromegaly present. Cardiovascular: Normal rate, regular rhythm, normal heart sounds and intact distal pulses. No murmur heard. Pulmonary/Chest: Effort normal and breath sounds normal. She has no wheezes. Abdominal: Soft. Bowel sounds are normal. She exhibits no distension and no mass. Musculoskeletal: She exhibits no edema. Left knee: She exhibits decreased range of motion, swelling and effusion. Tenderness found. Medial joint line and lateral joint line tenderness noted. Lumbar back: She exhibits decreased range of motion, tenderness, bony tenderness, pain and spasm. Patient moaning in pain and continuously moving  Significantly positive SLR and FADIR on left side. KARMEN was negative     Lymphadenopathy:     She has no cervical adenopathy. Neurological: She is alert and oriented to person, place, and time. She has normal reflexes. No cranial nerve deficit. Skin: Skin is warm and dry. No rash noted. She is not diaphoretic. Psychiatric: She has a normal mood and affect. Nursing note and vitals reviewed. ASSESSMENT and PLAN  Diagnoses and all orders for this visit:    1. Lumbar radiculopathy  -     dexAMETHasone (DECADRON) 2 mg tablet; 3 tablets x 3 days, 2 tablets x 3 days followed by 1tab for 3 days  -     methocarbamol (ROBAXIN) 750 mg tablet; Take 1 Tab by mouth three (3) times daily. 2. Obesity (BMI 30.0-34. 9)  Assessment & Plan:  Worsening. Discussed abnormal weight, ideal BMI and importance of diet and exercise to loose weight. Referral for exercise program was offered. Printed information about diet and lifestyle modification provided. 3. Acute midline low back pain with left-sided sciatica  -     dexAMETHasone (DECADRON) 2 mg tablet; 3 tablets x 3 days, 2 tablets x 3 days followed by 1tab for 3 days  -     methocarbamol (ROBAXIN) 750 mg tablet; Take 1 Tab by mouth three (3) times daily. 4. Acute pain of left knee  -     dexAMETHasone (DECADRON) 2 mg tablet; 3 tablets x 3 days, 2 tablets x 3 days followed by 1tab for 3 days    Discussed lifestyle issues and health guidance given  Patient was given an after visit summary which includes diagnoses, vital signs, current medications, instructions and references & authorized prescriptions . Results of labs will be conveyed to patient, once available. Pt verbalized instructions I provided and expressed understanding of discussion that was held today. Follow-up and Dispositions    · Return if symptoms worsen or fail to improve.

## 2019-10-09 ENCOUNTER — HOSPITAL ENCOUNTER (OUTPATIENT)
Dept: MAMMOGRAPHY | Age: 44
Discharge: HOME | End: 2019-10-09
Attending: OBSTETRICS & GYNECOLOGY
Payer: COMMERCIAL

## 2019-10-09 DIAGNOSIS — Z12.31 VISIT FOR SCREENING MAMMOGRAM: ICD-10-CM

## 2019-10-09 PROCEDURE — 77067 SCR MAMMO BI INCL CAD: CPT

## 2019-10-15 ENCOUNTER — HOSPITAL ENCOUNTER (OUTPATIENT)
Dept: MAMMOGRAPHY | Age: 44
Discharge: HOME | End: 2019-10-15
Attending: OBSTETRICS & GYNECOLOGY
Payer: COMMERCIAL

## 2019-10-15 DIAGNOSIS — R92.8 ABNORMAL MAMMOGRAM: ICD-10-CM

## 2019-10-15 PROCEDURE — 77065 DX MAMMO INCL CAD UNI: CPT

## 2020-05-23 ENCOUNTER — OFFICE VISIT (OUTPATIENT)
Dept: PRIMARY CARE CLINIC | Age: 45
End: 2020-05-23

## 2020-05-23 DIAGNOSIS — Z20.822 EXPOSURE TO COVID-19 VIRUS: Primary | ICD-10-CM

## 2020-05-23 NOTE — PROGRESS NOTES
Patient was seen at [facility] flu clinic. Patient consented to receive care and bill insurance. Please seen scanned note for visit documentation.

## 2020-05-26 LAB — SARS-COV-2, NAA: NOT DETECTED

## 2020-05-27 ENCOUNTER — TELEPHONE (OUTPATIENT)
Dept: FAMILY MEDICINE CLINIC | Age: 45
End: 2020-05-27

## 2020-05-27 NOTE — PROGRESS NOTES
Outbound call to patient. No answer left message for patient to return call to office regarding recent test results.

## 2020-05-27 NOTE — TELEPHONE ENCOUNTER
Patient returned call to office. Name and  verified. Reviewed recent test results with patient. She expressed understanding.

## 2020-06-24 ENCOUNTER — VIRTUAL VISIT (OUTPATIENT)
Dept: FAMILY MEDICINE CLINIC | Age: 45
End: 2020-06-24

## 2020-06-24 DIAGNOSIS — G47.33 OBSTRUCTIVE SLEEP APNEA SYNDROME: ICD-10-CM

## 2020-06-24 DIAGNOSIS — Z00.00 ROUTINE GENERAL MEDICAL EXAMINATION AT A HEALTH CARE FACILITY: Primary | ICD-10-CM

## 2020-06-24 DIAGNOSIS — E66.01 CLASS 2 SEVERE OBESITY DUE TO EXCESS CALORIES WITH SERIOUS COMORBIDITY IN ADULT, UNSPECIFIED BMI (HCC): ICD-10-CM

## 2020-06-24 RX ORDER — METFORMIN HYDROCHLORIDE 500 MG/1
500 TABLET ORAL
Qty: 90 TAB | Refills: 0 | Status: SHIPPED | OUTPATIENT
Start: 2020-06-24 | End: 2020-09-02 | Stop reason: SDUPTHER

## 2020-06-24 RX ORDER — PHENTERMINE HYDROCHLORIDE 30 MG/1
CAPSULE ORAL
Qty: 30 CAP | Refills: 0 | Status: SHIPPED | OUTPATIENT
Start: 2020-06-24 | End: 2020-07-27 | Stop reason: SDUPTHER

## 2020-06-24 NOTE — ASSESSMENT & PLAN NOTE
Worsening, based on history, physical exam and review of pertinent labs, studies and medications; meds reconciled; changes to treatment plan as per orders, lifestyle modifications recommended, medication compliance emphasized. Key Obesity Meds             phentermine 30 mg capsule (Taking) TK ONE C PO  QD    metFORMIN (GLUCOPHAGE) 500 mg tablet (Taking) Take 1 Tab by mouth daily (with breakfast).         Lab Results   Component Value Date/Time    Hemoglobin A1c 5.3 03/25/2019 09:55 AM    Glucose 78 03/25/2019 09:55 AM    Cholesterol, total 185 03/25/2019 09:55 AM    HDL Cholesterol 68 03/25/2019 09:55 AM    LDL, calculated 111 03/25/2019 09:55 AM    Triglyceride 28 03/25/2019 09:55 AM    TSH 1.120 03/25/2019 09:55 AM    Sodium 140 03/25/2019 09:55 AM    Potassium 4.7 03/25/2019 09:55 AM    ALT (SGPT) 23 03/25/2019 09:55 AM

## 2020-06-24 NOTE — PROGRESS NOTES
Jodi Dickson is a 41 y.o. female who was seen by synchronous (real-time) audio-video technology on 6/24/2020. Consent: Jodi Dickson, who was seen by synchronous (real-time) audio-video technology, and/or her healthcare decision maker, is aware that this patient-initiated, Telehealth encounter on 6/24/2020 is a billable service, with coverage as determined by her insurance carrier. She is aware that she may receive a bill and has provided verbal consent to proceed: Yes. Assessment & Plan:   Diagnoses and all orders for this visit:    1. Routine general medical examination at a health care facility  -     CBC WITH AUTOMATED DIFF  -     METABOLIC PANEL, COMPREHENSIVE  -     TSH REFLEX TO T4  -     LIPID PANEL  -     URINALYSIS W/ RFLX MICROSCOPIC  -     HEMOGLOBIN A1C WITH EAG    2. Obstructive sleep apnea syndrome  -     REFERRAL TO SLEEP STUDIES    3. Class 2 severe obesity due to excess calories with serious comorbidity in adult, unspecified BMI ([facility] [address]  Assessment & Plan:  Worsening, based on history, physical exam and review of pertinent labs, studies and medications; meds reconciled; changes to treatment plan as per orders, lifestyle modifications recommended, medication compliance emphasized. Key Obesity Meds             phentermine 30 mg capsule (Taking) TK ONE C PO  QD    metFORMIN (GLUCOPHAGE) 500 mg tablet (Taking) Take 1 Tab by mouth daily (with breakfast).         Lab Results   Component Value Date/Time    Hemoglobin A1c 5.3 03/25/2019 09:55 AM    Glucose 78 03/25/2019 09:55 AM    Cholesterol, total 185 03/25/2019 09:55 AM    HDL Cholesterol 68 03/25/2019 09:55 AM    LDL, calculated 111 03/25/2019 09:55 AM    Triglyceride 28 03/25/2019 09:55 AM    TSH 1.120 03/25/2019 09:55 AM    Sodium 140 03/25/2019 09:55 AM    Potassium 4.7 03/25/2019 09:55 AM    ALT (SGPT) 23 03/25/2019 09:55 AM           Orders:  -     REFERRAL TO SLEEP STUDIES  -     phentermine 30 mg capsule; TK ONE C PO  QD  - metFORMIN (GLUCOPHAGE) 500 mg tablet; Take 1 Tab by mouth daily (with breakfast). I spent at least 25 minutes on this visit with this established patient. Discussed importance of diet and exercise along with prescription appetite suppressants  Will do base line screening blood work  Subjective:   Elise Suggs is a 41 y.o. female who was seen for Weight Management; Sleep Apnea; and Physical  Health Maintenance  Immunizations:    Influenza: up to date. Tetanus: up to date. Gardasil: n/a. Cancer screening:   Cervical: reviewed guidelines, UTD, done by OBGYN, records requested. Breast: reviewed guidelines, reviewed SBE with her, UTD      Obesity  Patient complains of obesity. Patient cites health, increased physical ability as reasons for wanting to lose weight. Obesity History  Weight in late teens: 150 lb. Period of greatest weight gain: 50 lb during early thirtiees  Lowest adult weight: 172  Highest adult weight: 206  Amount of time at present weight: 189 lb. History of Weight Loss Efforts  Greatest amount of weight lost: 15 lb over 4 months  Amount of time that loss was maintained: 1 months  Circumstances associated with regain of weight: stopped exercise and diet plan  Successful weight loss techniques attempted: prescription appetite suppressants:   Unsuccessful weight loss techniques attempted: self-directed dieting, OTC appetite suppressants:     Current Exercise Habits no regular exercise    Current Eating Habits  Number of regular meals per day: 2  Number of snacking episodes per day: 2  Who shops for food? patient  Who prepares food? patient and spouse  Who eats with patient? patient and spouse  Binge behavior?: no  Purge behavior? no  Anorexic behavior? no  Eating precipitated by stress? no  Guilt feelings associated with eating?  yes -     Other Potential Contributing Factors  Use of alcohol: average 0 drinks/week  Use of medications that may cause weight gain none  History of past abuse? none  Psych History: not significant  Comorbidities: sleep apnea/hypopnea, osteoarthritis      Patient Care Team:  Jailyn Bynum MD as PCP - General (Family Practice)  Jailyn Bynum MD as PCP - [facility] Empaneled Provider  Other, MD Giana Álvarez MD (Obstetrics & Gynecology)       The following sections were reviewed & updated as appropriate: PMH, PSH, FH, and SH. Prior to Admission medications    Medication Sig Start Date End Date Taking? Authorizing Provider   phentermine 30 mg capsule TK ONE C PO  QD 6/24/20  Yes Jailyn Bynum MD   metFORMIN (GLUCOPHAGE) 500 mg tablet Take 1 Tab by mouth daily (with breakfast). 6/24/20  Yes Jailyn Bynum MD   om 3/E/linol/ala/oleic/gla/lip (OMEGA 3-6-9 PO) Take  by mouth. Yes Provider, Historical   lidocaine (LIDODERM) 5 % Apply 1 Patch to affected area every twelve (12) hours. Provider, Historical     Allergies   Allergen Reactions    Quine Itching       Patient Active Problem List    Diagnosis Date Noted    Class 2 severe obesity due to excess calories with serious comorbidity in adult [facility]) 03/06/2019     Current Outpatient Medications   Medication Sig Dispense Refill    phentermine 30 mg capsule TK ONE C PO  QD 30 Cap 0    metFORMIN (GLUCOPHAGE) 500 mg tablet Take 1 Tab by mouth daily (with breakfast). 90 Tab 0    om 3/E/linol/ala/oleic/gla/lip (OMEGA 3-6-9 PO) Take  by mouth.  lidocaine (LIDODERM) 5 % Apply 1 Patch to affected area every twelve (12) hours. Allergies   Allergen Reactions    Quine Itching     No past medical history on file.   Past Surgical History:   Procedure Laterality Date    HX APPENDECTOMY      HX OTHER SURGICAL      appendectomy     Family History   Problem Relation Age of Onset    Hypertension Mother     Diabetes Father     Hypertension Father     Arthritis-rheumatoid Maternal Grandmother     Arthritis-rheumatoid Paternal Grandmother      Social History     Tobacco Use    Smoking status: Never Smoker    Smokeless tobacco: Never Used   Substance Use Topics    Alcohol use: Yes     Comment: RARE       Review of Systems   Constitutional: Negative for chills, fever and malaise/fatigue. Weight gain   HENT: Negative for congestion, ear pain, sore throat and tinnitus. Eyes: Negative for blurred vision, double vision, pain and discharge. Respiratory: Negative for cough, shortness of breath and wheezing. Cardiovascular: Negative for chest pain, palpitations and leg swelling. Gastrointestinal: Negative for abdominal pain, blood in stool, constipation, diarrhea, nausea and vomiting. Genitourinary: Negative for dysuria, frequency, hematuria and urgency. Musculoskeletal: Negative for back pain, joint pain and myalgias. Skin: Negative for rash. Neurological: Negative for dizziness, tremors, seizures and headaches. Endo/Heme/Allergies: Negative for polydipsia. Does not bruise/bleed easily. Psychiatric/Behavioral: Negative for depression and substance abuse. The patient is not nervous/anxious. Objective:   Vital Signs: (As obtained by patient/caregiver at home)  There were no vitals taken for this visit.      [INSTRUCTIONS:  \"[x]\" Indicates a positive item  \"[]\" Indicates a negative item  -- DELETE ALL ITEMS NOT EXAMINED]    Constitutional: [x] Appears well-developed and well-nourished [x] No apparent distress      [] Abnormal -     Mental status: [x] Alert and awake  [x] Oriented to person/place/time [x] Able to follow commands    [] Abnormal -     Eyes:   EOM    [x]  Normal    [] Abnormal -   Sclera  [x]  Normal    [] Abnormal -          Discharge [x]  None visible   [] Abnormal -     HENT: [x] Normocephalic, atraumatic  [] Abnormal -   [x] Mouth/Throat: Mucous membranes are moist    External Ears [x] Normal  [] Abnormal -    Neck: [x] No visualized mass [] Abnormal -     Pulmonary/Chest: [x] Respiratory effort normal   [x] No visualized signs of difficulty breathing or respiratory distress        [] Abnormal -      Musculoskeletal:   [x] Normal gait with no signs of ataxia         [x] Normal range of motion of neck        [] Abnormal -     Neurological:        [x] No Facial Asymmetry (Cranial nerve 7 motor function) (limited exam due to video visit)          [x] No gaze palsy        [] Abnormal -          Skin:        [x] No significant exanthematous lesions or discoloration noted on facial skin         [] Abnormal -            Psychiatric:       [x] Normal Affect [] Abnormal -        [x] No Hallucinations    Other pertinent observable physical exam findings:-        We discussed the expected course, resolution and complications of the diagnosis(es) in detail. Medication risks, benefits, costs, interactions, and alternatives were discussed as indicated. I advised her to contact the office if her condition worsens, changes or fails to improve as anticipated. She expressed understanding with the diagnosis(es) and plan. Janell Skinner is a 41 y.o. female who was evaluated by a video visit encounter for concerns as above. Patient identification was verified prior to start of the visit. A caregiver was present when appropriate. Due to this being a TeleHealth encounter (During [place] [address] public health emergency), evaluation of the following organ systems was limited: Vitals/Constitutional/EENT/Resp/CV/GI//MS/Neuro/Skin/Heme-Lymph-Imm. Pursuant to the emergency declaration under the [facility] waiver authority and the [employer] General Act, this Virtual  Visit was conducted, with patient's (and/or legal guardian's) consent, to reduce the patient's risk of exposure to COVID-19 and provide necessary medical care. Services were provided through a video synchronous discussion virtually to substitute for in-person clinic visit.      This service was provided through telehealth, between patient Hector Ramos participating from home and Gladys Walker MD from [facility]     I discussed with the patient the nature of our telemedicine visits, that:      I would evaluate the patient and recommend diagnostics and treatments based on my assessment   Our sessions are not being recorded and that personal health information is protected   Our team would provide follow up care in person if/when the patient needs it        Lashon Black MD

## 2020-07-01 LAB
ALBUMIN SERPL-MCNC: 4.6 G/DL (ref 3.8–4.8)
ALBUMIN/GLOB SERPL: 1.9 {RATIO} (ref 1.2–2.2)
ALP SERPL-CCNC: 39 IU/L (ref 39–117)
ALT SERPL-CCNC: 29 IU/L (ref 0–32)
APPEARANCE UR: CLEAR
AST SERPL-CCNC: 29 IU/L (ref 0–40)
BASOPHILS # BLD AUTO: 0 X10E3/UL (ref 0–0.2)
BASOPHILS NFR BLD AUTO: 1 %
BILIRUB SERPL-MCNC: 0.5 MG/DL (ref 0–1.2)
BILIRUB UR QL STRIP: NEGATIVE
BUN SERPL-MCNC: 12 MG/DL (ref 6–24)
BUN/CREAT SERPL: 17 (ref 9–23)
CALCIUM SERPL-MCNC: 9.8 MG/DL (ref 8.7–10.2)
CHLORIDE SERPL-SCNC: 102 MMOL/L (ref 96–106)
CHOLEST SERPL-MCNC: 191 MG/DL (ref 100–199)
CO2 SERPL-SCNC: 26 MMOL/L (ref 20–29)
COLOR UR: YELLOW
CREAT SERPL-MCNC: 0.69 MG/DL (ref 0.57–1)
EOSINOPHIL # BLD AUTO: 0.1 X10E3/UL (ref 0–0.4)
EOSINOPHIL NFR BLD AUTO: 1 %
ERYTHROCYTE [DISTWIDTH] IN BLOOD BY AUTOMATED COUNT: 12.5 % (ref 11.7–15.4)
EST. AVERAGE GLUCOSE BLD GHB EST-MCNC: 108 MG/DL
GLOBULIN SER CALC-MCNC: 2.4 G/DL (ref 1.5–4.5)
GLUCOSE SERPL-MCNC: 75 MG/DL (ref 65–99)
GLUCOSE UR QL: NEGATIVE
HBA1C MFR BLD: 5.4 % (ref 4.8–5.6)
HCT VFR BLD AUTO: 40.5 % (ref 34–46.6)
HDLC SERPL-MCNC: 83 MG/DL
HGB BLD-MCNC: 13.3 G/DL (ref 11.1–15.9)
HGB UR QL STRIP: NEGATIVE
IMM GRANULOCYTES # BLD AUTO: 0 X10E3/UL (ref 0–0.1)
IMM GRANULOCYTES NFR BLD AUTO: 0 %
INTERPRETATION, 910389: NORMAL
KETONES UR QL STRIP: NEGATIVE
LDLC SERPL CALC-MCNC: 103 MG/DL (ref 0–99)
LEUKOCYTE ESTERASE UR QL STRIP: NEGATIVE
LYMPHOCYTES # BLD AUTO: 2.4 X10E3/UL (ref 0.7–3.1)
LYMPHOCYTES NFR BLD AUTO: 46 %
MCH RBC QN AUTO: 28.1 PG (ref 26.6–33)
MCHC RBC AUTO-ENTMCNC: 32.8 G/DL (ref 31.5–35.7)
MCV RBC AUTO: 85 FL (ref 79–97)
MICRO URNS: NORMAL
MONOCYTES # BLD AUTO: 0.4 X10E3/UL (ref 0.1–0.9)
MONOCYTES NFR BLD AUTO: 8 %
NEUTROPHILS # BLD AUTO: 2.2 X10E3/UL (ref 1.4–7)
NEUTROPHILS NFR BLD AUTO: 44 %
NITRITE UR QL STRIP: NEGATIVE
PH UR STRIP: 6 [PH] (ref 5–7.5)
PLATELET # BLD AUTO: 208 X10E3/UL (ref 150–450)
POTASSIUM SERPL-SCNC: 4.8 MMOL/L (ref 3.5–5.2)
PROT SERPL-MCNC: 7 G/DL (ref 6–8.5)
PROT UR QL STRIP: NEGATIVE
RBC # BLD AUTO: 4.74 X10E6/UL (ref 3.77–5.28)
SODIUM SERPL-SCNC: 140 MMOL/L (ref 134–144)
SP GR UR: 1.01 (ref 1–1.03)
TRIGL SERPL-MCNC: 26 MG/DL (ref 0–149)
TSH SERPL DL<=0.005 MIU/L-ACNC: 2.15 UIU/ML (ref 0.45–4.5)
UROBILINOGEN UR STRIP-MCNC: 0.2 MG/DL (ref 0.2–1)
VLDLC SERPL CALC-MCNC: 5 MG/DL (ref 5–40)
WBC # BLD AUTO: 5.1 X10E3/UL (ref 3.4–10.8)

## 2020-07-01 NOTE — PROGRESS NOTES
Please inform patient,  all results including blood count, kidney and liver function, thyroid profile, cholesterol, average blood sugar ( diabetes screening), urine, are very normal.  thanks

## 2020-07-27 ENCOUNTER — VIRTUAL VISIT (OUTPATIENT)
Dept: FAMILY MEDICINE CLINIC | Age: 45
End: 2020-07-27

## 2020-07-27 DIAGNOSIS — E66.01 CLASS 2 SEVERE OBESITY DUE TO EXCESS CALORIES WITH SERIOUS COMORBIDITY IN ADULT, UNSPECIFIED BMI (HCC): ICD-10-CM

## 2020-07-27 RX ORDER — PHENTERMINE HYDROCHLORIDE 30 MG/1
CAPSULE ORAL
Qty: 30 CAP | Refills: 0 | Status: SHIPPED | OUTPATIENT
Start: 2020-07-27 | End: 2020-09-02 | Stop reason: SDUPTHER

## 2020-07-27 NOTE — ASSESSMENT & PLAN NOTE
Improving, based on history, physical exam and review of pertinent labs, studies and medications; meds reconciled; continue current treatment plan, lifestyle modifications recommended, medication compliance emphasized. Key Obesity Meds             phentermine 30 mg capsule (Taking) TK ONE C PO  QD    metFORMIN (GLUCOPHAGE) 500 mg tablet (Taking) Take 1 Tab by mouth daily (with breakfast).         Lab Results   Component Value Date/Time    Hemoglobin A1c 5.4 06/30/2020 09:12 AM    Glucose 75 06/30/2020 09:12 AM    Cholesterol, total 191 06/30/2020 09:12 AM    HDL Cholesterol 83 06/30/2020 09:12 AM    LDL, calculated 103 06/30/2020 09:12 AM    Triglyceride 26 06/30/2020 09:12 AM    TSH 2.150 06/30/2020 09:12 AM    Sodium 140 06/30/2020 09:12 AM    Potassium 4.8 06/30/2020 09:12 AM    ALT (SGPT) 29 06/30/2020 09:12 AM

## 2020-07-27 NOTE — PROGRESS NOTES
Cruz Burciaga is a 41 y.o. female who was seen by synchronous (real-time) audio-video technology on 7/27/2020 for Weight Management        Assessment & Plan:   Diagnoses and all orders for this visit:    1. Class 2 severe obesity due to excess calories with serious comorbidity in adult, unspecified BMI ([address]  Assessment & Plan:  Improving, based on history, physical exam and review of pertinent labs, studies and medications; meds reconciled; continue current treatment plan, lifestyle modifications recommended, medication compliance emphasized. Key Obesity Meds             phentermine 30 mg capsule (Taking) TK ONE C PO  QD    metFORMIN (GLUCOPHAGE) 500 mg tablet (Taking) Take 1 Tab by mouth daily (with breakfast). Lab Results   Component Value Date/Time    Hemoglobin A1c 5.4 06/30/2020 09:12 AM    Glucose 75 06/30/2020 09:12 AM    Cholesterol, total 191 06/30/2020 09:12 AM    HDL Cholesterol 83 06/30/2020 09:12 AM    LDL, calculated 103 06/30/2020 09:12 AM    Triglyceride 26 06/30/2020 09:12 AM    TSH 2.150 06/30/2020 09:12 AM    Sodium 140 06/30/2020 09:12 AM    Potassium 4.8 06/30/2020 09:12 AM    ALT (SGPT) 29 06/30/2020 09:12 AM           Orders:  -     phentermine 30 mg capsule; TK ONE C PO  QD      I spent at least 20 minutes on this visit with this established patient. Tolerating Phentermine well. Has lost 5 lbs in a month. Recommended to work harder on diet and exercise along with medicines  Subjective:     Obesity  Patient complains of obesity. Patient cites health, increased physical ability as reasons for wanting to lose weight.     Obesity History  Weight in late teens: 150 lb. Period of greatest weight gain: 50 lb during early thirtiees  Lowest adult weight: 172  Highest adult weight: 206  Amount of time at present weight: 183.5 lb.      History of Weight Loss Efforts  Greatest amount of weight lost: 15 lb over 4 months  Amount of time that loss was maintained: 1 months  Circumstances associated with regain of weight: stopped exercise and diet plan  Successful weight loss techniques attempted: prescription appetite suppressants:   Unsuccessful weight loss techniques attempted: self-directed dieting, OTC appetite suppressants:      Current Exercise Habits no regular exercise     Current Eating Habits  Number of regular meals per day: 2  Number of snacking episodes per day: 2  Who shops for food? patient  Who prepares food? patient and spouse  Who eats with patient? patient and spouse  Binge behavior?: no  Purge behavior? no  Anorexic behavior? no  Eating precipitated by stress? no  Guilt feelings associated with eating? yes -      Other Potential Contributing Factors  Use of alcohol: average 0 drinks/week  Use of medications that may cause weight gain none  History of past abuse? none  Psych History: not significant  Comorbidities: sleep apnea/hypopnea, osteoarthritis    She was started on Metformin and Phentermine on last visit    Prior to Admission medications    Medication Sig Start Date End Date Taking? Authorizing Provider   phentermine 30 mg capsule TK ONE C PO  QD 7/27/20  Yes Da Menchaca MD   metFORMIN (GLUCOPHAGE) 500 mg tablet Take 1 Tab by mouth daily (with breakfast). 6/24/20  Yes Da Menchaca MD   om 3/E/linol/ala/oleic/gla/lip (OMEGA 3-6-9 PO) Take  by mouth. Yes Provider, Historical     Patient Active Problem List    Diagnosis Date Noted    Class 2 severe obesity due to excess calories with serious comorbidity in adult [facility]) 03/06/2019     Current Outpatient Medications   Medication Sig Dispense Refill    phentermine 30 mg capsule TK ONE C PO  QD 30 Cap 0    metFORMIN (GLUCOPHAGE) 500 mg tablet Take 1 Tab by mouth daily (with breakfast). 90 Tab 0    om 3/E/linol/ala/oleic/gla/lip (OMEGA 3-6-9 PO) Take  by mouth. Allergies   Allergen Reactions    Quine Itching     No past medical history on file.   Past Surgical History:   Procedure Laterality Date    HX APPENDECTOMY      HX OTHER SURGICAL      appendectomy     Family History   Problem Relation Age of Onset    Hypertension Mother     Diabetes Father     Hypertension Father     Arthritis-rheumatoid Maternal Grandmother     Arthritis-rheumatoid Paternal Grandmother      Social History     Tobacco Use    Smoking status: Never Smoker    Smokeless tobacco: Never Used   Substance Use Topics    Alcohol use: Yes     Comment: RARE       Review of Systems   Constitutional: Negative for chills, fever and malaise/fatigue. HENT: Negative for congestion, ear pain, sore throat and tinnitus. Eyes: Negative for blurred vision, double vision, pain and discharge. Respiratory: Negative for cough, shortness of breath and wheezing. Cardiovascular: Negative for chest pain, palpitations and leg swelling. Gastrointestinal: Negative for abdominal pain, blood in stool, constipation, diarrhea, nausea and vomiting. Genitourinary: Negative for dysuria, frequency, hematuria and urgency. Musculoskeletal: Negative for back pain, joint pain and myalgias. Skin: Negative for rash. Neurological: Negative for dizziness, tremors, seizures and headaches. Endo/Heme/Allergies: Negative for polydipsia. Does not bruise/bleed easily. Psychiatric/Behavioral: Negative for depression and substance abuse. The patient is not nervous/anxious.         Objective:     Patient-Reported Vitals 7/27/2020   Patient-Reported Weight 183lb   Patient-Reported Height -        [INSTRUCTIONS:  \"[x]\" Indicates a positive item  \"[]\" Indicates a negative item  -- DELETE ALL ITEMS NOT EXAMINED]    Constitutional: [x] Appears well-developed and well-nourished [x] No apparent distress      [] Abnormal -     Mental status: [x] Alert and awake  [x] Oriented to person/place/time [x] Able to follow commands    [] Abnormal -     Eyes:   EOM    [x]  Normal    [] Abnormal -   Sclera  [x]  Normal    [] Abnormal -          Discharge [x]  None visible   [] Abnormal -     HENT: [x] Normocephalic, atraumatic  [] Abnormal -   [x] Mouth/Throat: Mucous membranes are moist    External Ears [x] Normal  [] Abnormal -    Neck: [x] No visualized mass [] Abnormal -     Pulmonary/Chest: [x] Respiratory effort normal   [x] No visualized signs of difficulty breathing or respiratory distress        [] Abnormal -      Musculoskeletal:   [x] Normal gait with no signs of ataxia         [x] Normal range of motion of neck        [] Abnormal -     Neurological:        [x] No Facial Asymmetry (Cranial nerve 7 motor function) (limited exam due to video visit)          [x] No gaze palsy        [] Abnormal -          Skin:        [x] No significant exanthematous lesions or discoloration noted on facial skin         [] Abnormal -            Psychiatric:       [x] Normal Affect [] Abnormal -        [x] No Hallucinations    Other pertinent observable physical exam findings:-        We discussed the expected course, resolution and complications of the diagnosis(es) in detail. Medication risks, benefits, costs, interactions, and alternatives were discussed as indicated. I advised her to contact the office if her condition worsens, changes or fails to improve as anticipated. She expressed understanding with the diagnosis(es) and plan. Hany Mirza, who was evaluated through a patient-initiated, synchronous (real-time) audio-video encounter, and/or her healthcare decision maker, is aware that it is a billable service, with coverage as determined by her insurance carrier. She provided verbal consent to proceed: Yes, and patient identification was verified. It was conducted pursuant to the emergency declaration under the [address] [place] [address] and the [employer] General Act. A caregiver was present when appropriate. Ability to conduct physical exam was limited. I was in the office.  The patient was at home.     This service was provided through telehealth, between patient Koko Grijalva participating from home and Cielo Lazar MD from [facility]     I discussed with the patient the nature of our telemedicine visits, that:      I would evaluate the patient and recommend diagnostics and treatments based on my assessment   Our sessions are not being recorded and that personal health information is protected   Our team would provide follow up care in person if/when the patient needs it    Lorena Holt MD

## 2020-08-19 ENCOUNTER — DOCUMENTATION ONLY (OUTPATIENT)
Dept: SLEEP MEDICINE | Age: 45
End: 2020-08-19

## 2020-08-19 ENCOUNTER — VIRTUAL VISIT (OUTPATIENT)
Dept: SLEEP MEDICINE | Age: 45
End: 2020-08-19
Payer: COMMERCIAL

## 2020-08-19 DIAGNOSIS — G47.33 OSA (OBSTRUCTIVE SLEEP APNEA): Primary | ICD-10-CM

## 2020-08-19 DIAGNOSIS — G47.26 CIRCADIAN RHYTHM SLEEP DISORDER, SHIFT WORK TYPE: ICD-10-CM

## 2020-08-19 PROCEDURE — 99204 OFFICE O/P NEW MOD 45 MIN: CPT | Performed by: SPECIALIST

## 2020-08-19 NOTE — PROGRESS NOTES
[address] [place]. [place], [address]  Tel.  [phone]  Fax. [address] [facility] [address]  [place], [address] [facility]  Tel.  [phone]  Fax. [phone] [address] [facility] [place]   Tel.  [phone]  Fax. [phone]     Susan Cam is a 41 y.o. female who was seen by synchronous (real-time) audio-video technology on 8/19/2020. Consent:  She and/or her healthcare decision maker is aware that this patient-initiated Telehealth encounter is a billable service, with coverage as determined by her insurance carrier. She is aware that she may receive a bill and has provided verbal consent to proceed: Yes    I was in the office while conducting this encounter. Chief Complaint       No chief complaint on file. HPI      Susan Cam is 41 y.o. female seen for evaluation of a sleep disorder. She notes a history of snoring, associated apnea. She works nights at the [place] at which time she will go to bed at 10 AM and awaken intermittently  . She may get out of bed at times at 11 PM.  When not working she sleeps between 9 PM and 78 AM.    She has been told of snoring and associated apnea. She denies vivid dreaming or nightmares, sleep talking or sleepwalking, bruxism or nocturnal incontinence, abnormal arm or leg movements, hypnagogic hallucinations, sleep paralysis or cataplexy. She notes that she may doze if she is seated and inactive such as reading or in a public place such as movies as well as as a passenger or seated quietly after lunch. The patient has not undergone diagnostic testing for the current problems. Allergies   Allergen Reactions    Quine Itching       Current Outpatient Medications   Medication Sig Dispense Refill    phentermine 30 mg capsule TK ONE C PO  QD 30 Cap 0    metFORMIN (GLUCOPHAGE) 500 mg tablet Take 1 Tab by mouth daily (with breakfast).  90 Tab 0    om 3/E/linol/ala/oleic/gla/lip (OMEGA 3-6-9 PO) Take  by mouth. She  has no past medical history of Abnormal Pap smear, Abuse, Acquired hypothyroidism, Anemia NEC, Arrhythmia, Arthritis, Asthma, Asthma, Autoimmune disease ([address] CAD (coronary artery disease), Calculus of kidney, Cancer ([address] Chronic kidney disease, Chronic pain, Complication of anesthesia, Congestive heart failure, unspecified, Contact dermatitis and other eczema, due to unspecified cause, COPD, Depression, Diabetes ([address] Diabetes mellitus, Essential hypertension, Genital herpes, unspecified, GERD (gastroesophageal reflux disease), Headache(784.0), Heart abnormalities, Herpes gestationis, Herpes simplex without mention of complication, Human immunodeficiency virus (HIV) disease ([address] OTHER MEDICAL, Hypercholesterolemia, Hypertension, Infertility, Kidney disease, Liver disease, Phlebitis and thrombophlebitis of unspecified site, Postpartum depression, Psychiatric problem, Psychotic disorder ([address] PUD (peptic ulcer disease), Rhesus isoimmunization unspecified as to episode of care in pregnancy, Seizures ([address] Sickle-cell disease, unspecified, Stroke ([address] Systemic lupus erythematosus ([address] Thromboembolus ([address] Thyroid disease, Trauma, Unspecified breast disorder, Unspecified diseases of blood and blood-forming organs, or Unspecified epilepsy without mention of intractable epilepsy. She  has a past surgical history that includes hx appendectomy and hx other surgical.    She family history includes Arthritis-rheumatoid in her maternal grandmother and paternal grandmother; Diabetes in her father; Hypertension in her father and mother. She  reports that she has never smoked. She has never used smokeless tobacco. She reports current alcohol use. She reports that she does not use drugs. Review of Systems:  Review of Systems   Constitutional: Negative for chills and fever. HENT: Negative for hearing loss and tinnitus.     Eyes: Negative for blurred vision and double vision. Respiratory: Negative for cough and shortness of breath. Cardiovascular: Negative for chest pain and palpitations. Gastrointestinal: Negative for abdominal pain and heartburn. Genitourinary: Negative for frequency and urgency. Musculoskeletal: Negative for back pain and neck pain. Skin: Negative for itching and rash. Neurological: Positive for headaches. Psychiatric/Behavioral: Negative for depression. The patient is not nervous/anxious. Due to this being a telemedicine evaluation, certain elements of the physical examination are unable to be assessed. Objective:     Weight:180 lb  BMI: 34.09  General:   Conversant, cooperative   Eyes: no nystagmus   Oropharynx:    tongue normal                   Skin:   no obvious rashes   Neuro:  Speech fluent, face symmetrical, tongue movement normal   Psych:  Normal affect,  normal countenance        Assessment:       ICD-10-CM ICD-9-CM    1. AVELINA (obstructive sleep apnea)  G47.33 327.23 SLEEP STUDY UNATTENDED, 4 CHANNEL   2. Circadian rhythm sleep disorder, shift work type  G47.26 327.36      Potential sleep disordered breathing. She will be evaluated with a home sleep test. Results to be reviewed with her. Plan:     Orders Placed This Encounter    SLEEP STUDY UNATTENDED, 4 CHANNEL     Order Specific Question:   Reason for Exam     Answer:   snoring       * Patient has a history and examination consistent with the diagnosis of sleep apnea. * Home sleep testing was ordered for initial evaluation. * She was provided information on sleep apnea including corresponding risk factors and the importance of proper treatment. * Treatment options if indicated were reviewed today. Instructions:    o Do not engage in activities requiring a normal degree of alertness if fatigue is present.   o The patient understands that untreated or undertreated sleep apnea could impair judgement and the ability to function normally during the day.  o Call or return if symptoms worsen or persist.          Rose Marie Curtis MD, [facility]  Electronically signed 08/19/20       This note was created using voice recognition software. Despite editing, there may be syntax errors. This note will not be viewable in [address]

## 2020-09-02 ENCOUNTER — OFFICE VISIT (OUTPATIENT)
Dept: FAMILY MEDICINE CLINIC | Age: 45
End: 2020-09-02
Payer: COMMERCIAL

## 2020-09-02 VITALS
SYSTOLIC BLOOD PRESSURE: 119 MMHG | BODY MASS INDEX: 34.74 KG/M2 | HEART RATE: 91 BPM | WEIGHT: 184 LBS | RESPIRATION RATE: 18 BRPM | TEMPERATURE: 98.6 F | OXYGEN SATURATION: 97 % | HEIGHT: 61 IN | DIASTOLIC BLOOD PRESSURE: 70 MMHG

## 2020-09-02 DIAGNOSIS — Z71.3 WEIGHT LOSS COUNSELING, ENCOUNTER FOR: Primary | ICD-10-CM

## 2020-09-02 DIAGNOSIS — G47.33 OBSTRUCTIVE SLEEP APNEA SYNDROME: ICD-10-CM

## 2020-09-02 DIAGNOSIS — E66.9 OBESITY (BMI 30.0-34.9): ICD-10-CM

## 2020-09-02 PROBLEM — E66.01 CLASS 2 SEVERE OBESITY DUE TO EXCESS CALORIES WITH SERIOUS COMORBIDITY IN ADULT (HCC): Status: RESOLVED | Noted: 2019-03-06 | Resolved: 2020-09-02

## 2020-09-02 PROCEDURE — 99214 OFFICE O/P EST MOD 30 MIN: CPT | Performed by: FAMILY MEDICINE

## 2020-09-02 RX ORDER — PHENTERMINE HYDROCHLORIDE 30 MG/1
CAPSULE ORAL
Qty: 30 CAP | Refills: 1 | Status: SHIPPED | OUTPATIENT
Start: 2020-09-02 | End: 2020-11-30 | Stop reason: SDUPTHER

## 2020-09-02 RX ORDER — GABAPENTIN 300 MG/1
CAPSULE ORAL
COMMUNITY
Start: 2019-12-17 | End: 2021-05-24 | Stop reason: ALTCHOICE

## 2020-09-02 RX ORDER — METFORMIN HYDROCHLORIDE 500 MG/1
500 TABLET ORAL
Qty: 90 TAB | Refills: 0 | Status: SHIPPED | OUTPATIENT
Start: 2020-09-02 | End: 2020-11-30 | Stop reason: SDUPTHER

## 2020-09-02 NOTE — PATIENT INSTRUCTIONS
Learning About Dietary Guidelines  What are the Dietary Guidelines for Americans? Dietary Guidelines for Americans provide tips for eating well and staying healthy. This helps reduce the risk for long-term (chronic) diseases. These adult guidelines from the [place] recommend that you:  · Eat lots of fruits, vegetables, whole grains, and low-fat or nonfat dairy products. · Try to balance your eating with your activity. This helps you stay at a healthy weight. · Drink alcohol in moderation, if at all. · Limit foods high in salt, saturated fat, trans fat, and added sugar. What is MyPlate? MyPlate is the U.S. government's food guide. It can help you make your own well-balanced eating plan. A balanced eating plan means that you eat enough, but not too much, and that your food gives you the nutrients you need to stay healthy. MyPlate focuses on eating plenty of whole grains, fruits, and vegetables, and on limiting fat and sugar. It is available online at www. ChooseMyPlate.gov. How can you get started? If you're trying to eat healthier, you can slowly change your eating habits over time. You don't have to make big changes all at once. Start by adding one or two healthy foods to your meals each day. Grains  Choose whole-grain breads and cereals and whole-wheat pasta and whole-grain crackers. Vegetables  Eat a variety of vegetables every day. They have lots of nutrients and are part of a heart-healthy diet. Fruits  Eat a variety of fruits every day. Fruits contain lots of nutrients. Choose fresh fruit instead of fruit juice. Protein foods  Choose fish and lean poultry more often. Eat red meat and fried meats less often. Dried beans, tofu, and nuts are also good sources of protein. Dairy  Choose low-fat or fat-free products from this food group. If you have problems digesting milk, try eating cheese or yogurt instead. Fats and oils  Limit fats and oils if you're trying to cut calories. Choose healthy fats when you cook. These include canola oil and olive oil. Where can you learn more? Go to http://daniela-monie.info/  Enter G928 in the search box to learn more about \"Learning About Dietary Guidelines. \"  Current as of: August 22, 2019               Content Version: 12.5  © 3605-5746 [employer]. Care instructions adapted under license by [employer] (which disclaims liability or warranty for this information). If you have questions about a medical condition or this instruction, always ask your healthcare professional. [address] any warranty or liability for your use of this information. Learning About Low-Carbohydrate Diets  What is a low-carbohydrate diet? A low-carbohydrate (or \"low-carb\") diet limits foods and drinks that have carbohydrates. This includes grains, fruits, milk and yogurt, and starchy vegetables like potatoes, beans, and corn. It also avoids foods and drinks that have added sugar. Instead, low-carb diets include foods that are high in protein and fat. Why might you follow a low-carb diet? Low-carb diets may be used for a variety of reasons, such as for weight loss. People who have diabetes may use a low-carb diet to help manage their blood sugar levels. What should you do before you start the diet? Talk to your doctor before you try any diet. This is even more important if you have health problems like kidney disease, heart disease, or diabetes. Your doctor may suggest that you meet with a registered dietitian. A dietitian can help you make an eating plan that works for you. What foods do you eat on a low-carb diet? On a low-carb diet, you choose foods that are high in protein and fat. Examples of these are:  · Meat, poultry, and fish. · Eggs. · Nuts, such as walnuts, pecans, almonds, and peanuts. · Peanut butter and other nut butters. · Tofu. · Avocado. · Jain Ode.   · Non-starchy vegetables like broccoli, cauliflower, green beans, mushrooms, peppers, lettuce, and spinach. · Unsweetened non-dairy milks like almond milk and coconut milk. · Cheese, cottage cheese, and cream cheese. Current as of: August 22, 2019               Content Version: 12.5  © 4218-7948 Healthwise, [employer]. Care instructions adapted under license by [employer] (which disclaims liability or warranty for this information). If you have questions about a medical condition or this instruction, always ask your healthcare professional. [place] [address] any warranty or liability for your use of this information.

## 2020-09-02 NOTE — PROGRESS NOTES
HISTORY OF PRESENT ILLNESS  Hany Farooq is a 41 y.o. female. Seen for follow up on weight management and med refills  HPI  Obesity  Patient complains of obesity. Patient cites health, increased physical ability as reasons for wanting to lose weight.     Obesity History  Weight in late teens: 150 lb. Period of greatest weight gain: 50 lb during early thirtiees  Lowest adult weight: 172  Highest adult weight: 206  Amount of time at present weight: 184 lb for last 2 months     History of Weight Loss Efforts  Greatest amount of weight lost: 15 lb over 4 months  Amount of time that loss was maintained: 1 months  Circumstances associated with regain of weight: stopped exercise and diet plan  Successful weight loss techniques attempted: prescription appetite suppressants:   Unsuccessful weight loss techniques attempted: self-directed dieting, OTC appetite suppressants:      Current Exercise Habits no regular exercise     Current Eating Habits  Number of regular meals per day: 2  Number of snacking episodes per day: 2  Who shops for food? patient  Who prepares food? patient and spouse  Who eats with patient? patient and spouse  Binge behavior?: no  Purge behavior? no  Anorexic behavior? no  Eating precipitated by stress? no  Guilt feelings associated with eating? yes -      Other Potential Contributing Factors  Use of alcohol: average 0 drinks/week  Use of medications that may cause weight gain none  History of past abuse? none  Psych History: not significant  Comorbidities: sleep apnea/hypopnea, osteoarthritis     She is on Metformin and Phentermine on last visit  Review of Systems   Constitutional: Negative for chills, fever and malaise/fatigue. HENT: Negative for congestion, ear pain, sore throat and tinnitus. Eyes: Negative for blurred vision, double vision, pain and discharge. Respiratory: Negative for cough, shortness of breath and wheezing.     Cardiovascular: Negative for chest pain, palpitations and leg swelling. Gastrointestinal: Negative for abdominal pain, blood in stool, constipation, diarrhea, nausea and vomiting. Genitourinary: Negative for dysuria, frequency, hematuria and urgency. Musculoskeletal: Negative for back pain, joint pain and myalgias. Skin: Negative for rash. Neurological: Negative for dizziness, tremors, seizures and headaches. Endo/Heme/Allergies: Negative for polydipsia. Does not bruise/bleed easily. Psychiatric/Behavioral: Negative for depression and substance abuse. The patient is not nervous/anxious. Physical Exam  Vitals signs and nursing note reviewed. Constitutional:       Appearance: She is well-developed. She is not diaphoretic. HENT:      Head: Normocephalic and atraumatic. Right Ear: External ear normal.      Mouth/Throat:      Pharynx: No oropharyngeal exudate. Eyes:      General: No scleral icterus. Conjunctiva/sclera: Conjunctivae normal.      Pupils: Pupils are equal, round, and reactive to light. Neck:      Musculoskeletal: Normal range of motion and neck supple. Thyroid: No thyromegaly. Vascular: No JVD. Cardiovascular:      Rate and Rhythm: Normal rate and regular rhythm. Heart sounds: Normal heart sounds. No murmur. Pulmonary:      Effort: Pulmonary effort is normal.      Breath sounds: Normal breath sounds. No wheezing. Abdominal:      General: Bowel sounds are normal. There is no distension. Palpations: Abdomen is soft. There is no mass. Musculoskeletal: Normal range of motion. General: No tenderness. Lymphadenopathy:      Cervical: No cervical adenopathy. Skin:     General: Skin is warm and dry. Findings: No rash. Neurological:      Mental Status: She is alert and oriented to person, place, and time. Cranial Nerves: No cranial nerve deficit. Deep Tendon Reflexes: Reflexes are normal and symmetric.  Reflexes normal.         ASSESSMENT and PLAN  Diagnoses and all orders for this visit: 1. Weight loss counseling, encounter for    2. Obstructive sleep apnea syndrome    3. Obesity (BMI 30.0-34. 9)  Assessment & Plan:  Improving. Discussed abnormal weight, ideal BMI and importance of diet and exercise to loose weight. Referral for exercise program was offered. Printed information about diet and lifestyle modification provided. Wt Readings from Last 3 Encounters:   09/02/20 184 lb (83.5 kg)   09/03/19 180 lb 6.4 oz (81.8 kg)   08/21/19 180 lb 1.9 oz (81.7 kg)         Orders:  -     metFORMIN (GLUCOPHAGE) 500 mg tablet; Take 1 Tab by mouth daily (with breakfast). -     phentermine 30 mg capsule; TK ONE C PO  QD    I spent 25 minutes with the patient and > 50% of the time was spent counseling on low carbohydrate diet, different weight loss medicines, side effects of medicines  Discussed lifestyle issues and health guidance given  Patient was given an after visit summary which includes diagnoses, vital signs, current medications, instructions and references & authorized prescriptions . Results of labs will be conveyed to patient, once available. Pt verbalized instructions I provided and expressed understanding of discussion that was held today. Follow-up and Dispositions    · Return in about 4 months (around 1/2/2021) for follow up,weight management.

## 2020-09-02 NOTE — PROGRESS NOTES
Chief Complaint   Patient presents with    Complete Physical    Weight Management     1. Have you been to the ER, urgent care clinic since your last visit? Hospitalized since your last visit? No    2. Have you seen or consulted any other health care providers outside of the [address] [place] [address] since your last visit? Include any pap smears or colon screening.  No

## 2020-09-02 NOTE — ASSESSMENT & PLAN NOTE
Improving. Discussed abnormal weight, ideal BMI and importance of diet and exercise to loose weight. Referral for exercise program was offered. Printed information about diet and lifestyle modification provided.    Wt Readings from Last 3 Encounters:   09/02/20 184 lb (83.5 kg)   09/03/19 180 lb 6.4 oz (81.8 kg)   08/21/19 180 lb 1.9 oz (81.7 kg)

## 2020-09-04 ENCOUNTER — HOSPITAL ENCOUNTER (OUTPATIENT)
Dept: SLEEP MEDICINE | Age: 45
Discharge: HOME | End: 2020-09-04
Payer: COMMERCIAL

## 2020-09-04 PROCEDURE — 95806 SLEEP STUDY UNATT&RESP EFFT: CPT | Performed by: SPECIALIST

## 2020-10-02 ENCOUNTER — TELEPHONE (OUTPATIENT)
Dept: SLEEP MEDICINE | Age: 45
End: 2020-10-02

## 2020-10-02 NOTE — TELEPHONE ENCOUNTER
HSAT demonstrated mild sleep disordered breathing characterized by an overall AHI of 8.5/h associated with minimal SaO2 of 81%. Events more prominent supine with a supine-related AHI of 13.2/h. Snoring during 4.7% of the study. Impression: Sleep disordered breathing more prominent supine; associated at the time with arterial desaturations. Recommendation: Oral appliance may be preferable treatment. Sleep technologist: Please review HSAT results with the patient. Dental sleep medicine referral if patient in agreement.

## 2020-10-14 ENCOUNTER — DOCUMENTATION ONLY (OUTPATIENT)
Dept: SLEEP MEDICINE | Age: 45
End: 2020-10-14

## 2020-10-14 NOTE — PROGRESS NOTES
Faxed referral to Dentistry to fabricate the OAT. Patient informed, he said he will all back to schedule OAT follow up.

## 2020-11-30 DIAGNOSIS — E66.9 OBESITY (BMI 30.0-34.9): ICD-10-CM

## 2020-11-30 RX ORDER — PHENTERMINE HYDROCHLORIDE 30 MG/1
CAPSULE ORAL
Qty: 30 CAP | Refills: 0 | Status: SHIPPED | OUTPATIENT
Start: 2020-11-30 | End: 2021-01-21 | Stop reason: SDUPTHER

## 2020-11-30 RX ORDER — METFORMIN HYDROCHLORIDE 500 MG/1
500 TABLET ORAL
Qty: 90 TAB | Refills: 0 | Status: SHIPPED | OUTPATIENT
Start: 2020-11-30 | End: 2021-01-21 | Stop reason: SDUPTHER

## 2020-11-30 NOTE — TELEPHONE ENCOUNTER
MD Madeline Alvarez,    Patient call requesting refills below. Check  for phentermine. Thanks, Alejandra Melendez    Last Visit: 9/2/20  MD Madeline Alvarez  Next Appointment: Not scheduled- due 1/2021  Previous Refill Encounter(s): 9/2/20  (both)  Metformin 90  Phentermine 30 + 1    Requested Prescriptions     Pending Prescriptions Disp Refills    metFORMIN (GLUCOPHAGE) 500 mg tablet 90 Tab 0     Sig: Take 1 Tab by mouth daily (with breakfast).     phentermine 30 mg capsule 30 Cap 1     Sig: TK ONE C PO  QD

## 2021-01-21 ENCOUNTER — TELEPHONE (OUTPATIENT)
Dept: FAMILY MEDICINE CLINIC | Age: 46
End: 2021-01-21

## 2021-01-21 DIAGNOSIS — E66.9 OBESITY (BMI 30.0-34.9): ICD-10-CM

## 2021-01-21 RX ORDER — METFORMIN HYDROCHLORIDE 500 MG/1
500 TABLET ORAL
Qty: 90 TAB | Refills: 0 | Status: CANCELLED | OUTPATIENT
Start: 2021-01-21

## 2021-01-21 RX ORDER — PHENTERMINE HYDROCHLORIDE 30 MG/1
30 CAPSULE ORAL DAILY
Qty: 30 CAP | Refills: 0 | Status: CANCELLED | OUTPATIENT
Start: 2021-01-21

## 2021-01-21 RX ORDER — METFORMIN HYDROCHLORIDE 500 MG/1
500 TABLET ORAL
Qty: 90 TAB | Refills: 0 | Status: SHIPPED | OUTPATIENT
Start: 2021-01-21 | End: 2021-01-28 | Stop reason: SDUPTHER

## 2021-01-21 RX ORDER — PHENTERMINE HYDROCHLORIDE 30 MG/1
CAPSULE ORAL
Qty: 30 CAP | Refills: 0 | Status: SHIPPED | OUTPATIENT
Start: 2021-01-21 | End: 2021-03-04 | Stop reason: SDUPTHER

## 2021-01-21 NOTE — TELEPHONE ENCOUNTER
Patient called stated she wanted to speak with Dr. Rose Crsepo, however they did not want to be specific as to why they were calling, patient then ended the call.         BCB#[phone]

## 2021-01-21 NOTE — TELEPHONE ENCOUNTER
Patient 2 identifiers confirmed. Patient scheduled for an follow up visit for 1/28/2021 at 3:40 pm.  Patient requesting medication refills of metformin and phentermine.   Rachid Marroquin LPN

## 2021-01-28 ENCOUNTER — OFFICE VISIT (OUTPATIENT)
Dept: FAMILY MEDICINE CLINIC | Age: 46
End: 2021-01-28
Payer: COMMERCIAL

## 2021-01-28 VITALS
TEMPERATURE: 97.9 F | WEIGHT: 190.8 LBS | HEART RATE: 95 BPM | BODY MASS INDEX: 36.02 KG/M2 | RESPIRATION RATE: 18 BRPM | OXYGEN SATURATION: 99 % | HEIGHT: 61 IN | SYSTOLIC BLOOD PRESSURE: 115 MMHG | DIASTOLIC BLOOD PRESSURE: 77 MMHG

## 2021-01-28 DIAGNOSIS — E66.01 CLASS 2 SEVERE OBESITY DUE TO EXCESS CALORIES WITH SERIOUS COMORBIDITY AND BODY MASS INDEX (BMI) OF 36.0 TO 36.9 IN ADULT (HCC): Primary | ICD-10-CM

## 2021-01-28 DIAGNOSIS — Z71.3 WEIGHT LOSS COUNSELING, ENCOUNTER FOR: ICD-10-CM

## 2021-01-28 PROBLEM — E66.9 OBESITY (BMI 30.0-34.9): Status: RESOLVED | Noted: 2020-09-02 | Resolved: 2021-01-28

## 2021-01-28 PROCEDURE — 99213 OFFICE O/P EST LOW 20 MIN: CPT | Performed by: FAMILY MEDICINE

## 2021-01-28 RX ORDER — METFORMIN HYDROCHLORIDE 500 MG/1
500 TABLET ORAL 2 TIMES DAILY WITH MEALS
Qty: 180 TAB | Refills: 0 | Status: SHIPPED | OUTPATIENT
Start: 2021-01-28 | End: 2021-04-26

## 2021-01-28 NOTE — PROGRESS NOTES
HISTORY OF PRESENT ILLNESS  Hany Duran is a 41 y.o. female. 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 She hardly sleep 2-3 hours daily   HPI    Obesity  Patient complains of obesity. Patient cites health, increased physical ability as reasons for wanting to lose weight.     Obesity History  Weight in late teens: 150 lb. Period of greatest weight gain: 50 lb during early thirtiees  Lowest adult weight: 172  Highest adult weight: 206  Amount of time at present weight: 184 lb for last 2 months     History of Weight Loss Efforts  Greatest amount of weight lost: 15 lb over 4 months  Amount of time that loss was maintained: 1 months  Circumstances associated with regain of weight: stopped exercise and diet plan  Successful weight loss techniques attempted: prescription appetite suppressants:   Unsuccessful weight loss techniques attempted: self-directed dieting, OTC appetite suppressants:      Current Exercise Habits no regular exercise     Current Eating Habits  Number of regular meals per day: 2  Number of snacking episodes per day: 2  Who shops for food? patient  Who prepares food? patient and spouse  Who eats with patient? patient and spouse  Binge behavior?: no  Purge behavior? no  Anorexic behavior? no  Eating precipitated by stress? no  Guilt feelings associated with eating? yes -      Other Potential Contributing Factors  Use of alcohol: average 0 drinks/week  Use of medications that may cause weight gain none  History of past abuse? none  Psych History: not significant  Comorbidities: sleep apnea/hypopnea, osteoarthritis     She is on Metformin and Phentermine that she is not taking consistently.   She had sleep study done in 10/2020   HSAT demonstrated mild sleep disordered breathing characterized by an overall AHI of 8.5/h associated with minimal SaO2 of 81%. Events more prominent supine with a supinerelated AHI of 13.2/h. Snoring during 4.7% of the study.     Impression: Sleep disordered breathing more prominent supine; associated at the time with arterial desaturations.     Recommendation: Oral appliance may be preferable treatment. She has not scheduled her dental appointment yet  Review of Systems   Constitutional: Negative for chills, fever and malaise/fatigue. HENT: Negative for congestion, ear pain, sore throat and tinnitus. Eyes: Negative for blurred vision, double vision, pain and discharge. Respiratory: Negative for cough, shortness of breath and wheezing. Cardiovascular: Negative for chest pain, palpitations and leg swelling. Gastrointestinal: Negative for abdominal pain, blood in stool, constipation, diarrhea, nausea and vomiting. Genitourinary: Negative for dysuria, frequency, hematuria and urgency. Musculoskeletal: Negative for back pain, joint pain and myalgias. Skin: Negative for rash. Neurological: Negative for dizziness, tremors, seizures and headaches. Endo/Heme/Allergies: Negative for polydipsia. Does not bruise/bleed easily. Psychiatric/Behavioral: Negative for depression and substance abuse. The patient is nervous/anxious. Physical Exam  Vitals signs and nursing note reviewed. Constitutional:       Appearance: She is well-developed. She is not diaphoretic. HENT:      Head: Normocephalic and atraumatic. Right Ear: External ear normal.      Mouth/Throat:      Pharynx: No oropharyngeal exudate. Eyes:      General: No scleral icterus. Conjunctiva/sclera: Conjunctivae normal.      Pupils: Pupils are equal, round, and reactive to light. Neck:      Musculoskeletal: Normal range of motion and neck supple. Thyroid: No thyromegaly. Vascular: No JVD. Cardiovascular:      Rate and Rhythm: Normal rate and regular rhythm. Heart sounds: Normal heart sounds.  No murmur. Pulmonary:      Effort: Pulmonary effort is normal.      Breath sounds: Normal breath sounds. No wheezing. Abdominal:      General: Bowel sounds are normal. There is no distension. Palpations: Abdomen is soft. There is no mass. Musculoskeletal: Normal range of motion. General: No tenderness. Lymphadenopathy:      Cervical: No cervical adenopathy. Skin:     General: Skin is warm and dry. Findings: No rash. Neurological:      Mental Status: She is alert and oriented to person, place, and time. Cranial Nerves: No cranial nerve deficit. Deep Tendon Reflexes: Reflexes are normal and symmetric. Reflexes normal.         ASSESSMENT and PLAN  Diagnoses and all orders for this visit:    1. Class 2 severe obesity due to excess calories with serious comorbidity and body mass index (BMI) of 36.0 to 36.9 in adult [facility])  Assessment & Plan:  Worsening, based on history, physical exam and review of pertinent labs, studies and medications; meds reconciled; changes to treatment plan as per orders, lifestyle modifications recommended, medication compliance emphasized. Wt Readings from Last 3 Encounters:   01/28/21 190 lb 12.8 oz (86.5 kg)   09/02/20 184 lb (83.5 kg)   09/03/19 180 lb 6.4 oz (81.8 kg)         Orders:  -     metFORMIN (GLUCOPHAGE) 500 mg tablet; Take 1 Tab by mouth two (2) times daily (with meals). 2. Weight loss counseling, encounter for  -     metFORMIN (GLUCOPHAGE) 500 mg tablet; Take 1 Tab by mouth two (2) times daily (with meals). Adjusted Metformin dose. Again discussed importance of proper sleep and exercise along with her weight loss medicines  Discussed lifestyle issues and health guidance given  Patient was given an after visit summary which includes diagnoses, vital signs, current medications, instructions and references & authorized prescriptions . Results of labs will be conveyed to patient, once available.   Pt verbalized instructions I provided and expressed understanding of discussion that was held today. Follow-up and Dispositions    · Return in about 4 months (around 5/28/2021) for follow up.

## 2021-01-28 NOTE — PROGRESS NOTES
Chief Complaint   Patient presents with    Medication Refill     follow up       1. Have you been to the ER, urgent care clinic since your last visit? Hospitalized since your last visit? No    2. Have you seen or consulted any other health care providers outside of the [address] [place] [address] since your last visit? Include any pap smears or colon screening. No    Have you had the covid vaccine. No..  when     3 most recent PHQ Screens 1/28/2021   Little interest or pleasure in doing things Not at all   Feeling down, depressed, irritable, or hopeless Not at all   Total Score PHQ 2 0       Health Maintenance Due   Topic Date Due    COVID-19 Vaccine (1 of 2) 03/05/1991

## 2021-01-28 NOTE — ASSESSMENT & PLAN NOTE
Worsening, based on history, physical exam and review of pertinent labs, studies and medications; meds reconciled; changes to treatment plan as per orders, lifestyle modifications recommended, medication compliance emphasized.    Wt Readings from Last 3 Encounters:   01/28/21 190 lb 12.8 oz (86.5 kg)   09/02/20 184 lb (83.5 kg)   09/03/19 180 lb 6.4 oz (81.8 kg)

## 2021-01-28 NOTE — PATIENT INSTRUCTIONS
Diet and Exercise for Metabolic Syndrome: Care Instructions Your Care Instructions Metabolic syndrome is the name for a group of health problems. It includes having too much fat around your waist and high blood pressure. It also includes high triglycerides, high blood sugar, and low levels of healthy (HDL) cholesterol. These problems make it more likely you will have a heart attack or stroke or get diabetes. Your family history (your genes) can cause metabolic syndrome. So can unhealthy eating habits and not getting enough exercise. You can help lower your risk of heart attack, stroke, and diabetes if you eat healthy foods and get more exercise. It may be hard to make these lifestyle changes. But even small changes can help. Follow-up care is a key part of your treatment and safety. Be sure to make and go to all appointments, and call your doctor if you are having problems. It's also a good idea to know your test results and keep a list of the medicines you take. How can you care for yourself at home? Eat more fruits and vegetables · Fruits and vegetables have nutrients to help protect you from heart disease and high blood pressure. They are low in fat and high in fiber. Dark green, orange, and yellow ones are the healthiest. 
· Keep lots of vegetables ready for snacks. · Buy fruit that is in season. Then put it where you can see it so you will want to eat it. · Cook dishes that have a lot of vegetables. Soups and stir-fries are good choices. Limit saturated and trans fats · Read food labels, and try to avoid saturated and trans fats. They increase your risk of heart disease. · Use olive or canola oil when you cook. · Bake, broil, grill, or steam foods. Avoid fried foods. · Limit how much high-fat meat you eat. This includes hot dogs and sausages. When you prepare meat, cut off all the fat. · You can replace high-fat meat with fish and skinless poultry. You can also try products made from soybeans, like tofu. Soybeans may be very good for your heart. · Choose low-fat or fat-free milk and dairy products. Eat foods high in fiber · Foods high in fiber may reduce your cholesterol. And they may give you important vitamins and minerals. Good examples are oatmeal, cooked dried beans, brown rice, citrus fruits, and apples. · Eat whole-grain breads and cereals. They have more fiber than white bread or pastries. Limit high-sugar foods · Limit foods and drinks that are high in sugar. Some examples are soda pop, sugar-sweetened fruit drinks, candy, and many desserts. · Limit the amount of sugar, honey, and other sweeteners that you add to food and drinks. · Choose water instead of soda pop or other sugar-sweetened drinks. · Limit fruit juice. Limit salt and sodium · You can help lower your blood pressure if you limit salt and sodium. · If you take the salt shaker off the table, it may be easier to use less salt. You can also try using half the salt in a recipe. And you can avoid adding salt to cooking water for pasta, rice, and potatoes. · Try to eat fewer snacks, fast foods, and other high-salt, processed foods. Check labels so you know how much sodium a food has. · Choose canned goods (soups, vegetables, and beans) that are low in sodium. Get regular exercise · Get more exercise. Make sure your doctor knows when you start a new exercise program. Even small amounts of exercise will help you get stronger and have more energy. It can also help you manage your weight and your stress. · Walking is a good choice. Little by little, increase the amount you walk every day. Try for at least 30 minutes on most days of the week. Where can you learn more? Go to http://www.[employer].com/ Enter 600 [id number] in the search box to learn more about \"Diet and Exercise for Metabolic Syndrome: Care Instructions. \" Current as of: August 22, 2019               Content Version: 12.6 © 5111-3154 [employer]. Care instructions adapted under license by [employer] (which disclaims liability or warranty for this information). If you have questions about a medical condition or this instruction, always ask your healthcare professional. [place] [address] any warranty or liability for your use of this information.

## 2021-03-04 DIAGNOSIS — E66.9 OBESITY (BMI 30.0-34.9): ICD-10-CM

## 2021-03-04 RX ORDER — PHENTERMINE HYDROCHLORIDE 30 MG/1
30 CAPSULE ORAL DAILY
Qty: 30 CAP | Refills: 0 | Status: SHIPPED | OUTPATIENT
Start: 2021-03-04 | End: 2021-04-13 | Stop reason: SDUPTHER

## 2021-03-04 NOTE — TELEPHONE ENCOUNTER
MD Mary Gottlieb,    Patient call requesting refill of phentermine. Check . Thanks, Rick Ohara    Last Visit: 1/28/21  MD Mary Gottlieb  Next Appointment: Not scheduled  Previous Refill Encounter(s): 1/21/21? 30    Requested Prescriptions     Pending Prescriptions Disp Refills    phentermine 30 mg capsule 30 Cap 0     Sig: Take 1 Cap by mouth daily. Max Daily Amount: 30 mg.

## 2021-04-13 DIAGNOSIS — E66.9 OBESITY (BMI 30.0-34.9): ICD-10-CM

## 2021-04-13 RX ORDER — PHENTERMINE HYDROCHLORIDE 30 MG/1
30 CAPSULE ORAL DAILY
Qty: 30 CAP | Refills: 0 | Status: SHIPPED | OUTPATIENT
Start: 2021-04-13 | End: 2021-05-19 | Stop reason: SDUPTHER

## 2021-04-13 RX ORDER — PHENTERMINE HYDROCHLORIDE 30 MG/1
30 CAPSULE ORAL DAILY
Qty: 30 CAP | Refills: 0 | OUTPATIENT
Start: 2021-04-13

## 2021-04-13 NOTE — TELEPHONE ENCOUNTER
No access to      Last visit 01/28/2021 MD Juan Antonio Moise   Next appointment 4 months (05/2021)  Previous refill encounter(s)   03/04/2021 Phentermine #30     Requested Prescriptions     Pending Prescriptions Disp Refills    phentermine 30 mg capsule 30 Cap 0     Sig: Take 1 Cap by mouth daily. Max Daily Amount: 30 mg.

## 2021-04-13 NOTE — TELEPHONE ENCOUNTER
.Patient is calling requesting a refill on the medication  . Requested Prescriptions     Pending Prescriptions Disp Refills    phentermine 30 mg capsule 30 Cap 0     Sig: Take 1 Cap by mouth daily. Max Daily Amount: 30 mg. [facility] verified          Best callback: [phone]        LOV: 1/28/21

## 2021-04-13 NOTE — TELEPHONE ENCOUNTER
MD Madeline Alvarez,    Patient call requesting refill of phentermine. Check . Thanks, Alejandra Melendez    Last Visit: 1/28/21 MD rose  Next Appointment: Not scheduled  Previous Refill Encounter(s): 3/4/21 30    Requested Prescriptions     Pending Prescriptions Disp Refills    phentermine 30 mg capsule 30 Cap 0     Sig: Take 1 Cap by mouth daily. Max Daily Amount: 30 mg.

## 2021-04-26 DIAGNOSIS — E66.01 CLASS 2 SEVERE OBESITY DUE TO EXCESS CALORIES WITH SERIOUS COMORBIDITY AND BODY MASS INDEX (BMI) OF 36.0 TO 36.9 IN ADULT (HCC): ICD-10-CM

## 2021-04-26 DIAGNOSIS — Z71.3 WEIGHT LOSS COUNSELING, ENCOUNTER FOR: ICD-10-CM

## 2021-04-26 RX ORDER — METFORMIN HYDROCHLORIDE 500 MG/1
TABLET ORAL
Qty: 180 TAB | Refills: 0 | Status: SHIPPED | OUTPATIENT
Start: 2021-04-26 | End: 2021-09-15

## 2021-05-19 DIAGNOSIS — Z71.3 WEIGHT LOSS COUNSELING, ENCOUNTER FOR: ICD-10-CM

## 2021-05-19 DIAGNOSIS — E66.9 OBESITY (BMI 30.0-34.9): ICD-10-CM

## 2021-05-19 DIAGNOSIS — E66.01 CLASS 2 SEVERE OBESITY DUE TO EXCESS CALORIES WITH SERIOUS COMORBIDITY AND BODY MASS INDEX (BMI) OF 36.0 TO 36.9 IN ADULT (HCC): ICD-10-CM

## 2021-05-19 RX ORDER — METFORMIN HYDROCHLORIDE 500 MG/1
TABLET ORAL
Qty: 180 TABLET | Refills: 0 | Status: CANCELLED | OUTPATIENT
Start: 2021-05-19

## 2021-05-19 RX ORDER — PHENTERMINE HYDROCHLORIDE 30 MG/1
30 CAPSULE ORAL DAILY
Qty: 30 CAPSULE | Refills: 0 | Status: SHIPPED | OUTPATIENT
Start: 2021-05-19 | End: 2021-06-22 | Stop reason: SDUPTHER

## 2021-05-19 NOTE — TELEPHONE ENCOUNTER
Patient called requesting a refill      . Requested Prescriptions     Pending Prescriptions Disp Refills    phentermine 30 mg capsule 30 Capsule 0     Sig: Take 1 Capsule by mouth daily.  Max Daily Amount: 30 mg.    metFORMIN (GLUCOPHAGE) 500 mg tablet 180 Tablet 0       Best call back #[phone]

## 2021-05-19 NOTE — TELEPHONE ENCOUNTER
Duplicate request: Glucophage 500 mg #180 was sent to [facility] on 04/26/2021. No access to      Last visit 01/28/2021 MD Emelyn Rivera   Next appointment 4 months (05/2021)   Previous refill encounter(s)   04/13/2021 Phentermine #30    Requested Prescriptions     Pending Prescriptions Disp Refills    phentermine 30 mg capsule 30 Capsule 0     Sig: Take 1 Capsule by mouth daily.  Max Daily Amount: 30 mg.    metFORMIN (GLUCOPHAGE) 500 mg tablet 180 Tablet 0

## 2021-05-19 NOTE — TELEPHONE ENCOUNTER
Due to medication she is on, she was recommended to have strict 3-4 months follow-up. Last seen in January. Will need an appointment to continue with prescription as per controlled drug prescription protocol. Please let her know. We will refill this time.

## 2021-05-24 ENCOUNTER — VIRTUAL VISIT (OUTPATIENT)
Dept: FAMILY MEDICINE CLINIC | Age: 46
End: 2021-05-24
Payer: COMMERCIAL

## 2021-05-24 DIAGNOSIS — Z71.3 WEIGHT LOSS COUNSELING, ENCOUNTER FOR: ICD-10-CM

## 2021-05-24 DIAGNOSIS — E66.01 CLASS 2 SEVERE OBESITY DUE TO EXCESS CALORIES WITH SERIOUS COMORBIDITY AND BODY MASS INDEX (BMI) OF 36.0 TO 36.9 IN ADULT (HCC): Primary | ICD-10-CM

## 2021-05-24 PROCEDURE — 99213 OFFICE O/P EST LOW 20 MIN: CPT | Performed by: FAMILY MEDICINE

## 2021-05-24 NOTE — PROGRESS NOTES
Ailyn Rondon is a 40 y.o. female who was seen by synchronous (real-time) audio-video technology on 5/24/2021 for Weight Management        Assessment & Plan:   Diagnoses and all orders for this visit:    1. Class 2 severe obesity due to excess calories with serious comorbidity and body mass index (BMI) of 36.0 to 36.9 in adult [facility])  Assessment & Plan:   borderline controlled, medication adherence emphasized, lifestyle modifications recommended   Wt Readings from Last 3 Encounters:   01/28/21 190 lb 12.8 oz (86.5 kg)   09/02/20 184 lb (83.5 kg)   09/03/19 180 lb 6.4 oz (81.8 kg)           2. Weight loss counseling, encounter for      I spent at least 20 minutes on this visit with this established patient. Visit was conducted as a part of controlled drug prescription protocol as she is on phentermine. Strongly recommended to work on exercise and diet along with prescription medications. 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 Subjective:     Obesity  Patient complains of obesity. Patient cites health, increased physical ability as reasons for wanting to lose weight.     Obesity History  Weight in late teens: 150 lb.   Period of greatest weight gain: 50 lb during early thirtiees  Lowest adult weight: 172  Highest adult weight: 206  Amount of time at present weight: 184 lb for last 2 months     History of Weight Loss Efforts  Greatest amount of weight lost: 15 lb over 4 months  Amount of time that loss was maintained: 1 months  Circumstances associated with regain of weight: stopped exercise and diet plan  Successful weight loss techniques attempted: prescription appetite suppressants:   Unsuccessful weight loss techniques attempted: self-directed dieting, OTC appetite suppressants:      Current Exercise Habits no regular exercise     Current Eating Habits  Number of regular meals per day: 2  Number of snacking episodes per day: 2  Who shops for food? patient  Who prepares food? patient and spouse  Who eats with patient? patient and spouse  Binge behavior?: no  Purge behavior? no  Anorexic behavior? no  Eating precipitated by stress? no  Guilt feelings associated with eating? yes -      Other Potential Contributing Factors  Use of alcohol: average 0 drinks/week  Use of medications that may cause weight gain none  History of past abuse? none  Psych History: not significant  Comorbidities: sleep apnea/hypopnea, osteoarthritis     She is on Metformin and Phentermine that she is not taking consistently. She had sleep study done in 10/2020   HSAT demonstrated mild sleep disordered breathing characterized by an overall AHI of 8.5/h associated with minimal SaO2 of 81%.  Events more prominent supine with a supinerelated AHI of 13.2/h.  Snoring during 4.7% of the study.     Impression: Sleep disordered breathing more prominent supine; associated at the time with arterial desaturations.     Recommendation: Oral appliance may be preferable treatment. She has not scheduled her dental appointment yet  Prior to Admission medications    Medication Sig Start Date End Date Taking? Authorizing Provider   phentermine 30 mg capsule Take 1 Capsule by mouth daily. Max Daily Amount: 30 mg. 5/19/21  Yes Tania Biggs MD   metFORMIN (GLUCOPHAGE) 500 mg tablet TAKE 1 TABLET BY MOUTH TWICE A DAY WITH MEALS 4/26/21  Yes Tania Biggs MD   om 3/E/linol/ala/oleic/gla/lip (OMEGA 3-6-9 PO) Take  by mouth. Provider, Historical     Patient Active Problem List    Diagnosis Date Noted    Weight loss counseling, encounter for 01/28/2021    Class 2 severe obesity due to excess calories with serious comorbidity and body mass index (BMI) of 36.0 to 36.9 in adult [facility]) 03/06/2019     Current Outpatient Medications   Medication Sig Dispense Refill    phentermine 30 mg capsule Take 1 Capsule by mouth daily.  Max Daily Amount: 30 mg. 30 Capsule 0    metFORMIN (GLUCOPHAGE) 500 mg tablet TAKE 1 TABLET BY MOUTH TWICE A DAY WITH MEALS 180 Tab 0    om 3/E/linol/ala/oleic/gla/lip (OMEGA 3-6-9 PO) Take  by mouth. Allergies   Allergen Reactions    Quine Itching     No past medical history on file. Past Surgical History:   Procedure Laterality Date    HX APPENDECTOMY      HX OTHER SURGICAL      appendectomy     Family History   Problem Relation Age of Onset    Hypertension Mother     Diabetes Father     Hypertension Father     Arthritis-rheumatoid Maternal Grandmother     Arthritis-rheumatoid Paternal Grandmother      Social History     Tobacco Use    Smoking status: Never Smoker    Smokeless tobacco: Never Used   Substance Use Topics    Alcohol use: Yes     Comment: RARE       Review of Systems   Constitutional: Negative for chills, fever and malaise/fatigue. HENT: Negative for congestion, ear pain, sore throat and tinnitus. Eyes: Negative for blurred vision, double vision, pain and discharge. Respiratory: Negative for cough, shortness of breath and wheezing. Cardiovascular: Negative for chest pain, palpitations and leg swelling. Gastrointestinal: Negative for abdominal pain, blood in stool, constipation, diarrhea, nausea and vomiting. Genitourinary: Negative for dysuria, frequency, hematuria and urgency. Musculoskeletal: Negative for back pain, joint pain and myalgias. Skin: Negative for rash. Neurological: Negative for dizziness, tremors, seizures and headaches. Endo/Heme/Allergies: Negative for polydipsia. Does not bruise/bleed easily. Psychiatric/Behavioral: Negative for depression and substance abuse. The patient is not nervous/anxious.         Objective:     Patient-Reported Vitals 5/24/2021   Patient-Reported Weight 187lb   Patient-Reported Height -        [INSTRUCTIONS:  \"[x]\" Indicates a positive item  \"[]\" Indicates a negative item  -- DELETE ALL ITEMS NOT EXAMINED]    Constitutional: [x] Appears well-developed and well-nourished [x] No apparent distress      [] Abnormal -     Mental status: [x] Alert and awake  [x] Oriented to person/place/time [x] Able to follow commands    [] Abnormal -     Eyes:   EOM    [x]  Normal    [] Abnormal -   Sclera  [x]  Normal    [] Abnormal -          Discharge [x]  None visible   [] Abnormal -     HENT: [x] Normocephalic, atraumatic  [] Abnormal -   [x] Mouth/Throat: Mucous membranes are moist    External Ears [x] Normal  [] Abnormal -    Neck: [x] No visualized mass [] Abnormal -     Pulmonary/Chest: [x] Respiratory effort normal   [x] No visualized signs of difficulty breathing or respiratory distress        [] Abnormal -      Musculoskeletal:   [x] Normal gait with no signs of ataxia         [x] Normal range of motion of neck        [] Abnormal -     Neurological:        [x] No Facial Asymmetry (Cranial nerve 7 motor function) (limited exam due to video visit)          [x] No gaze palsy        [] Abnormal -          Skin:        [x] No significant exanthematous lesions or discoloration noted on facial skin         [] Abnormal -            Psychiatric:       [x] Normal Affect [] Abnormal -        [x] No Hallucinations    Other pertinent observable physical exam findings:-        We discussed the expected course, resolution and complications of the diagnosis(es) in detail. Medication risks, benefits, costs, interactions, and alternatives were discussed as indicated. I advised her to contact the office if her condition worsens, changes or fails to improve as anticipated. She expressed understanding with the diagnosis(es) and plan. Hany Dickdodiedev, was evaluated through a synchronous (real-time) audio-video encounter. The patient (or guardian if applicable) is aware that this is a billable service. Verbal consent to proceed has been obtained within the past 12 months.  The visit was conducted pursuant to the emergency declaration under the [facility], [facility] waiver authority and the [employer] Act. Patient identification was verified, and a caregiver was present when appropriate. The patient was located in a state where the provider was credentialed to provide care.     This service was provided through telehealth, between patient Evie morton from [place] and Jenny Oneill MD from [facility]     I discussed with the patient the nature of our telemedicine visits, that:      I would evaluate the patient and recommend diagnostics and treatments based on my assessment   Our sessions are not being recorded and that personal health information is protected   Our team would provide follow up care in person if/when the patient needs it    Dusty Schuster MD

## 2021-06-22 DIAGNOSIS — E66.9 OBESITY (BMI 30.0-34.9): ICD-10-CM

## 2021-06-22 RX ORDER — PHENTERMINE HYDROCHLORIDE 30 MG/1
30 CAPSULE ORAL DAILY
Qty: 30 CAPSULE | Refills: 0 | Status: SHIPPED | OUTPATIENT
Start: 2021-06-22 | End: 2021-08-02 | Stop reason: SDUPTHER

## 2021-06-22 NOTE — TELEPHONE ENCOUNTER
----- Message from [facility] sent at 6/22/2021  8:34 AM EDT -----  Regarding: MD Chavez/refbhupinder  Medication Refill    Caller (if not patient):N/A      Relationship of caller (if not patient):N/A      Best contact number(s):[phone]      Name of medication and dosage if known:unknown (weight loss)      Is patient out of this medication (yes/no): Y      Pharmacy name:[facility]    Pharmacy listed in chart? (yes/no): Y  Pharmacy phone number:N/A      Details to clarify the request:N/A      [facility]

## 2021-08-02 DIAGNOSIS — E66.9 OBESITY (BMI 30.0-34.9): ICD-10-CM

## 2021-08-02 RX ORDER — PHENTERMINE HYDROCHLORIDE 30 MG/1
30 CAPSULE ORAL DAILY
Qty: 30 CAPSULE | Refills: 0 | Status: SHIPPED | OUTPATIENT
Start: 2021-08-02 | End: 2021-09-15 | Stop reason: SDUPTHER

## 2021-08-02 NOTE — TELEPHONE ENCOUNTER
.Patient is calling requesting a refill on the medication. Requested Prescriptions     Pending Prescriptions Disp Refills    phentermine 30 mg capsule 30 Capsule 0     Sig: Take 1 Capsule by mouth daily. Max Daily Amount: 30 mg. [facility] on file verified          Best callback:        LOV: 5/24/21

## 2021-08-02 NOTE — TELEPHONE ENCOUNTER
Last visit 05/24/2021 Virtual visit MD Gleason Fail   Next appointment Nothing scheduled   Previous refill encounter(s)   06/22/2021 Phentermine #30     No access to      Requested Prescriptions     Pending Prescriptions Disp Refills    phentermine 30 mg capsule 30 Capsule 0     Sig: Take 1 Capsule by mouth daily. Max Daily Amount: 30 mg.

## 2021-09-15 DIAGNOSIS — E66.01 CLASS 2 SEVERE OBESITY DUE TO EXCESS CALORIES WITH SERIOUS COMORBIDITY AND BODY MASS INDEX (BMI) OF 36.0 TO 36.9 IN ADULT (HCC): ICD-10-CM

## 2021-09-15 DIAGNOSIS — Z71.3 WEIGHT LOSS COUNSELING, ENCOUNTER FOR: ICD-10-CM

## 2021-09-15 DIAGNOSIS — E66.9 OBESITY (BMI 30.0-34.9): ICD-10-CM

## 2021-09-15 RX ORDER — METFORMIN HYDROCHLORIDE 500 MG/1
TABLET ORAL
Qty: 180 TABLET | Refills: 0 | Status: SHIPPED | OUTPATIENT
Start: 2021-09-15 | End: 2021-11-30 | Stop reason: SDUPTHER

## 2021-09-15 RX ORDER — PHENTERMINE HYDROCHLORIDE 30 MG/1
30 CAPSULE ORAL DAILY
Qty: 30 CAPSULE | Refills: 0 | Status: SHIPPED | OUTPATIENT
Start: 2021-09-15 | End: 2021-11-30 | Stop reason: SDUPTHER

## 2021-09-15 NOTE — TELEPHONE ENCOUNTER
Request for refill of phentermine per message. Check . Thanks, Carine Self    Last Visit: 5/24/21 MD Edinson Saldivar  Next Appointment: 10/6/21 MD Edinson Saldivar  Previous Refill Encounter(s): 8/2/21 30    Requested Prescriptions     Pending Prescriptions Disp Refills    phentermine 30 mg capsule 30 Capsule 0     Sig: Take 1 Capsule by mouth daily. Max Daily Amount: 30 mg.

## 2021-11-01 ENCOUNTER — TRANSCRIBE ORDER (OUTPATIENT)
Dept: SCHEDULING | Age: 46
End: 2021-11-01

## 2021-11-01 DIAGNOSIS — Z12.31 SCREENING MAMMOGRAM FOR HIGH-RISK PATIENT: Primary | ICD-10-CM

## 2021-11-09 DIAGNOSIS — E66.9 OBESITY (BMI 30.0-34.9): ICD-10-CM

## 2021-11-09 RX ORDER — PHENTERMINE HYDROCHLORIDE 30 MG/1
30 CAPSULE ORAL DAILY
Qty: 30 CAPSULE | Refills: 0 | OUTPATIENT
Start: 2021-11-09

## 2021-11-09 NOTE — TELEPHONE ENCOUNTER
Patient is uncontrolled a prescription  Appointment every 3 to 4 months. Her last appointment was in May. She canceled her appointment in October at last minute and scheduled for January. I would not refill her medication until patient is seen. She notes strict 4 months follow-up.   Thanks

## 2021-11-29 ENCOUNTER — HOSPITAL ENCOUNTER (OUTPATIENT)
Dept: MAMMOGRAPHY | Age: 46
Discharge: HOME | End: 2021-11-29
Attending: OBSTETRICS & GYNECOLOGY
Payer: COMMERCIAL

## 2021-11-29 DIAGNOSIS — Z12.31 SCREENING MAMMOGRAM FOR HIGH-RISK PATIENT: ICD-10-CM

## 2021-11-29 PROCEDURE — 77067 SCR MAMMO BI INCL CAD: CPT

## 2021-11-30 ENCOUNTER — OFFICE VISIT (OUTPATIENT)
Dept: FAMILY MEDICINE CLINIC | Age: 46
End: 2021-11-30
Payer: COMMERCIAL

## 2021-11-30 VITALS
RESPIRATION RATE: 16 BRPM | OXYGEN SATURATION: 100 % | WEIGHT: 188.2 LBS | SYSTOLIC BLOOD PRESSURE: 110 MMHG | TEMPERATURE: 97.3 F | DIASTOLIC BLOOD PRESSURE: 74 MMHG | HEART RATE: 70 BPM | BODY MASS INDEX: 35.53 KG/M2 | HEIGHT: 61 IN

## 2021-11-30 DIAGNOSIS — Z71.3 WEIGHT LOSS COUNSELING, ENCOUNTER FOR: Primary | ICD-10-CM

## 2021-11-30 DIAGNOSIS — E66.01 CLASS 2 SEVERE OBESITY DUE TO EXCESS CALORIES WITH SERIOUS COMORBIDITY AND BODY MASS INDEX (BMI) OF 36.0 TO 36.9 IN ADULT (HCC): ICD-10-CM

## 2021-11-30 DIAGNOSIS — E66.9 OBESITY (BMI 30.0-34.9): ICD-10-CM

## 2021-11-30 PROCEDURE — 99213 OFFICE O/P EST LOW 20 MIN: CPT | Performed by: FAMILY MEDICINE

## 2021-11-30 RX ORDER — METFORMIN HYDROCHLORIDE 500 MG/1
500 TABLET ORAL 2 TIMES DAILY WITH MEALS
Qty: 180 TABLET | Refills: 1 | Status: SHIPPED | OUTPATIENT
Start: 2021-11-30 | End: 2022-03-11 | Stop reason: SDUPTHER

## 2021-11-30 RX ORDER — PHENTERMINE HYDROCHLORIDE 30 MG/1
30 CAPSULE ORAL DAILY
Qty: 30 CAPSULE | Refills: 0 | Status: SHIPPED | OUTPATIENT
Start: 2021-11-30 | End: 2022-01-04 | Stop reason: SDUPTHER

## 2021-11-30 NOTE — PATIENT INSTRUCTIONS

## 2021-11-30 NOTE — LETTER
NOTIFICATION RETURN TO WORK     11/30/2021 9:58 AM    Ms. Cosmo Lpoez  [address] [address]      To Whom It May Concern:    Cosmo Lopez is currently under the care of NURYS Zuleta. She was seen in office today and should be excused for time for her appointment in office. If there are questions or concerns please have the patient contact our office.         Sincerely,      Michele Dixon MD

## 2021-11-30 NOTE — PROGRESS NOTES
Chief Complaint   Patient presents with    Medication Refill     follow up         1. \"Have you been to the ER, urgent care clinic since your last visit? Hospitalized since your last visit? \" No    2. \"Have you seen or consulted any other health care providers outside of the [address] [place] [address] since your last visit? \" No     3. For patients over 45: Has the patient had a colonoscopy? No cologuard    If the patient is female:    4. For patients over 40: Has the patient had a mammogram? Yes, HM satisfied with blue hyperlink    5. For patients over 21: Has the patient had a pap smear? Yes, but HM not satisfied.  Rooming MA/LPN to request most recent results at [facility] last month       3 most recent [facility] 36 Screens 11/30/2021   Little interest or pleasure in doing things Not at all   Feeling down, depressed, irritable, or hopeless Not at all   Total Score PHQ 2 0       Health Maintenance Due   Topic Date Due    Hepatitis C Screening  Never done    Cervical cancer screen  Never done    Colorectal Cancer Screening Combo  Never done    DTaP/Tdap/Td series (2 - Td or Tdap) 09/01/2021

## 2021-11-30 NOTE — PROGRESS NOTES
HISTORY OF PRESENT ILLNESS  Hany Espinoza is a 40 y.o. female. Patient was seen today for weight loss management and follow-up on severe obesity. HPI  Obesity  Patient complains of obesity. Patient cites health, increased physical ability as reasons for wanting to lose weight.     Obesity History  Weight in late teens: 150 lb. Period of greatest weight gain: 50 lb during early thirtiees  Lowest adult weight: 172  Highest adult weight: 206  Amount of time at present weight: 188  lb for last 2 months     History of Weight Loss Efforts  Greatest amount of weight lost: 15 lb over 4 months  Amount of time that loss was maintained: 1 months  Circumstances associated with regain of weight: stopped exercise and diet plan  Successful weight loss techniques attempted: prescription appetite suppressants:   Unsuccessful weight loss techniques attempted: self-directed dieting, OTC appetite suppressants:      Current Exercise Habits no regular exercise     Current Eating Habits  Number of regular meals per day: 2  Number of snacking episodes per day: 2  Who shops for food? patient  Who prepares food? patient and spouse  Who eats with patient? patient and spouse  Binge behavior?: no  Purge behavior? no  Anorexic behavior? no  Eating precipitated by stress? no  Guilt feelings associated with eating? yes -      Other Potential Contributing Factors  Use of alcohol: average 0 drinks/week  Use of medications that may cause weight gain none  History of past abuse? none  Psych History: not significant  Comorbidities: sleep apnea/hypopnea, osteoarthritis     She is on Metformin and Phentermine that she is not taking consistently.   She had sleep study done in 10/2020   HSAT demonstrated mild sleep disordered breathing characterized by an overall AHI of 8.5/h associated with minimal SaO2 of 81%.  Events more prominent supine with a supinerelated AHI of 13.2/h.  Snoring during 4.7% of the study.     Impression: Sleep disordered breathing more prominent supine; associated at the time with arterial desaturations.     Recommendation: Oral appliance may be preferable treatment. She has not scheduled her dental appointment yet  Review of Systems   Constitutional: Negative for chills, fever and malaise/fatigue. HENT: Negative for congestion, ear pain, sore throat and tinnitus. Eyes: Negative for blurred vision, double vision, pain and discharge. Respiratory: Negative for cough, shortness of breath and wheezing. Cardiovascular: Negative for chest pain, palpitations and leg swelling. Gastrointestinal: Negative for abdominal pain, blood in stool, constipation, diarrhea, nausea and vomiting. Genitourinary: Negative for dysuria, frequency, hematuria and urgency. Musculoskeletal: Negative for back pain, joint pain and myalgias. Skin: Negative for rash. Neurological: Negative for dizziness, tremors, seizures and headaches. Endo/Heme/Allergies: Negative for polydipsia. Does not bruise/bleed easily. Psychiatric/Behavioral: Negative for depression and substance abuse. The patient is not nervous/anxious. Physical Exam  Vitals and nursing note reviewed. Constitutional:       Appearance: She is well-developed. She is not diaphoretic. HENT:      Head: Normocephalic and atraumatic. Right Ear: External ear normal.      Mouth/Throat:      Pharynx: No oropharyngeal exudate. Eyes:      General: No scleral icterus. Conjunctiva/sclera: Conjunctivae normal.      Pupils: Pupils are equal, round, and reactive to light. Neck:      Thyroid: No thyromegaly. Vascular: No JVD. Cardiovascular:      Rate and Rhythm: Normal rate and regular rhythm. Heart sounds: Normal heart sounds. No murmur heard. Pulmonary:      Effort: Pulmonary effort is normal.      Breath sounds: Normal breath sounds. No wheezing. Abdominal:      General: Bowel sounds are normal. There is no distension. Palpations: Abdomen is soft. There is no mass. Musculoskeletal:         General: No tenderness. Normal range of motion. Cervical back: Normal range of motion and neck supple. Lymphadenopathy:      Cervical: No cervical adenopathy. Skin:     General: Skin is warm and dry. Findings: No rash. Neurological:      Mental Status: She is alert and oriented to person, place, and time. Cranial Nerves: No cranial nerve deficit. Deep Tendon Reflexes: Reflexes are normal and symmetric. Reflexes normal.         ASSESSMENT and PLAN  Diagnoses and all orders for this visit:    1. Weight loss counseling, encounter for  -     metFORMIN (GLUCOPHAGE) 500 mg tablet; Take 1 Tablet by mouth two (2) times daily (with meals). 2. Class 2 severe obesity due to excess calories with serious comorbidity and body mass index (BMI) of 36.0 to 36.9 in adult (HCC)  -     metFORMIN (GLUCOPHAGE) 500 mg tablet; Take 1 Tablet by mouth two (2) times daily (with meals). 3. Obesity (BMI 30.0-34.9)  -     phentermine 30 mg capsule; Take 1 Capsule by mouth daily. Max Daily Amount: 30 mg. Again we had a long discussion on diet and exercise as a key element for weight loss along with medications. Discussed lifestyle issues and health guidance given  Patient was given an after visit summary which includes diagnoses, vital signs, current medications, instructions and references & authorized prescriptions . Results of labs will be conveyed to patient, once available. Pt verbalized instructions I provided and expressed understanding of discussion that was held today. Please note that this dictation was completed with [employer], the computer voice recognition software. Quite often unanticipated grammatical, syntax, homophones, and other interpretive errors are inadvertently transcribed by the computer software. Please disregard these errors. Please excuse any errors that have escaped final proofreading. Thank you.

## 2022-01-04 DIAGNOSIS — E66.9 OBESITY (BMI 30.0-34.9): ICD-10-CM

## 2022-01-05 ENCOUNTER — TELEPHONE (OUTPATIENT)
Dept: FAMILY MEDICINE CLINIC | Age: 47
End: 2022-01-05

## 2022-01-05 NOTE — TELEPHONE ENCOUNTER
----- Message from Elza Plasencia sent at 1/5/2022  2:29 PM EST -----  Subject: Message to Provider    QUESTIONS  Information for Provider? Pt phnd back in regards to refill rqst on 1/4   for phentermine 30mg cap - pt is completely out; please call pt to advise;   no answer at [facility]  ---------------------------------------------------------------------------  --------------  [address]  What is the best way for the office to contact you? OK to leave message on   voicemail  Preferred Call Back Phone Number? [phone]  ---------------------------------------------------------------------------  --------------  SCRIPT ANSWERS  Relationship to Patient?  Self

## 2022-01-05 NOTE — TELEPHONE ENCOUNTER
Called patient several times. If patient calls back please inform that medication refill request has been sent to covering provider.  Pt will be notified by pharmacy once they receive the prescription

## 2022-01-06 RX ORDER — PHENTERMINE HYDROCHLORIDE 30 MG/1
30 CAPSULE ORAL DAILY
Qty: 30 CAPSULE | Refills: 0 | Status: SHIPPED | OUTPATIENT
Start: 2022-01-06 | End: 2022-02-09 | Stop reason: SDUPTHER

## 2022-01-06 NOTE — TELEPHONE ENCOUNTER
Outbound call to patient. Name and  verified. Informed pt that medication refill request has been sent to the covering provider but not yet approved because it a controlled drug.  Pt will wait for Maximo Garsia

## 2022-01-06 NOTE — TELEPHONE ENCOUNTER
Pt called and said she is travelling tomw and her flight leaves at 9 am and she needs to have med called in today.

## 2022-01-17 ENCOUNTER — OFFICE VISIT (OUTPATIENT)
Dept: FAMILY MEDICINE CLINIC | Age: 47
End: 2022-01-17
Payer: COMMERCIAL

## 2022-01-17 VITALS
HEIGHT: 61 IN | TEMPERATURE: 97.6 F | BODY MASS INDEX: 34.81 KG/M2 | HEART RATE: 79 BPM | DIASTOLIC BLOOD PRESSURE: 68 MMHG | WEIGHT: 184.4 LBS | SYSTOLIC BLOOD PRESSURE: 102 MMHG | RESPIRATION RATE: 16 BRPM

## 2022-01-17 DIAGNOSIS — Z11.59 SCREENING FOR VIRAL DISEASE: ICD-10-CM

## 2022-01-17 DIAGNOSIS — Z00.00 ROUTINE GENERAL MEDICAL EXAMINATION AT A HEALTH CARE FACILITY: Primary | ICD-10-CM

## 2022-01-17 DIAGNOSIS — Z11.59 NEED FOR HEPATITIS C SCREENING TEST: ICD-10-CM

## 2022-01-17 DIAGNOSIS — E66.9 OBESITY (BMI 30.0-34.9): ICD-10-CM

## 2022-01-17 PROBLEM — E66.01 CLASS 2 SEVERE OBESITY DUE TO EXCESS CALORIES WITH SERIOUS COMORBIDITY AND BODY MASS INDEX (BMI) OF 36.0 TO 36.9 IN ADULT (HCC): Status: RESOLVED | Noted: 2019-03-06 | Resolved: 2022-01-17

## 2022-01-17 LAB
ALBUMIN SERPL-MCNC: 4 G/DL (ref 3.5–5)
ALBUMIN/GLOB SERPL: 1.2 {RATIO} (ref 1.1–2.2)
ALP SERPL-CCNC: 50 U/L (ref 45–117)
ALT SERPL-CCNC: 28 U/L (ref 12–78)
ANION GAP SERPL CALC-SCNC: 2 MMOL/L (ref 5–15)
APPEARANCE UR: CLEAR
AST SERPL-CCNC: 18 U/L (ref 15–37)
BASOPHILS # BLD: 0.1 K/UL (ref 0–0.1)
BASOPHILS NFR BLD: 1 % (ref 0–1)
BILIRUB SERPL-MCNC: 0.6 MG/DL (ref 0.2–1)
BILIRUB UR QL: NEGATIVE
BUN SERPL-MCNC: 11 MG/DL (ref 6–20)
BUN/CREAT SERPL: 16 (ref 12–20)
CALCIUM SERPL-MCNC: 9.2 MG/DL (ref 8.5–10.1)
CHLORIDE SERPL-SCNC: 106 MMOL/L (ref 97–108)
CHOLEST SERPL-MCNC: 196 MG/DL
CO2 SERPL-SCNC: 30 MMOL/L (ref 21–32)
COLOR UR: NORMAL
CREAT SERPL-MCNC: 0.67 MG/DL (ref 0.55–1.02)
DIFFERENTIAL METHOD BLD: NORMAL
EOSINOPHIL # BLD: 0.1 K/UL (ref 0–0.4)
EOSINOPHIL NFR BLD: 1 % (ref 0–7)
ERYTHROCYTE [DISTWIDTH] IN BLOOD BY AUTOMATED COUNT: 13.5 % (ref 11.5–14.5)
EST. AVERAGE GLUCOSE BLD GHB EST-MCNC: 103 MG/DL
GLOBULIN SER CALC-MCNC: 3.3 G/DL (ref 2–4)
GLUCOSE SERPL-MCNC: 78 MG/DL (ref 65–100)
GLUCOSE UR STRIP.AUTO-MCNC: NEGATIVE MG/DL
HBA1C MFR BLD: 5.2 % (ref 4–5.6)
HCT VFR BLD AUTO: 43.5 % (ref 35–47)
HCV AB SERPL QL IA: NONREACTIVE
HDLC SERPL-MCNC: 76 MG/DL
HDLC SERPL: 2.6 {RATIO} (ref 0–5)
HGB BLD-MCNC: 13.6 G/DL (ref 11.5–16)
HGB UR QL STRIP: NEGATIVE
IMM GRANULOCYTES # BLD AUTO: 0 K/UL (ref 0–0.04)
IMM GRANULOCYTES NFR BLD AUTO: 0 % (ref 0–0.5)
KETONES UR QL STRIP.AUTO: NEGATIVE MG/DL
LDLC SERPL CALC-MCNC: 110 MG/DL (ref 0–100)
LEUKOCYTE ESTERASE UR QL STRIP.AUTO: NEGATIVE
LYMPHOCYTES # BLD: 1.8 K/UL (ref 0.8–3.5)
LYMPHOCYTES NFR BLD: 35 % (ref 12–49)
MCH RBC QN AUTO: 27.5 PG (ref 26–34)
MCHC RBC AUTO-ENTMCNC: 31.3 G/DL (ref 30–36.5)
MCV RBC AUTO: 88.1 FL (ref 80–99)
MONOCYTES # BLD: 0.4 K/UL (ref 0–1)
MONOCYTES NFR BLD: 8 % (ref 5–13)
NEUTS SEG # BLD: 2.9 K/UL (ref 1.8–8)
NEUTS SEG NFR BLD: 55 % (ref 32–75)
NITRITE UR QL STRIP.AUTO: NEGATIVE
NRBC # BLD: 0 K/UL (ref 0–0.01)
NRBC BLD-RTO: 0 PER 100 WBC
PH UR STRIP: 5.5 [PH] (ref 5–8)
PLATELET # BLD AUTO: 252 K/UL (ref 150–400)
PMV BLD AUTO: 12 FL (ref 8.9–12.9)
POTASSIUM SERPL-SCNC: 4.4 MMOL/L (ref 3.5–5.1)
PROT SERPL-MCNC: 7.3 G/DL (ref 6.4–8.2)
PROT UR STRIP-MCNC: NEGATIVE MG/DL
RBC # BLD AUTO: 4.94 M/UL (ref 3.8–5.2)
SODIUM SERPL-SCNC: 138 MMOL/L (ref 136–145)
SP GR UR REFRACTOMETRY: 1.02 (ref 1–1.03)
TRIGL SERPL-MCNC: 50 MG/DL (ref ?–150)
TSH SERPL DL<=0.05 MIU/L-ACNC: 0.83 UIU/ML (ref 0.36–3.74)
UROBILINOGEN UR QL STRIP.AUTO: 0.2 EU/DL (ref 0.2–1)
VLDLC SERPL CALC-MCNC: 10 MG/DL
WBC # BLD AUTO: 5.2 K/UL (ref 3.6–11)

## 2022-01-17 PROCEDURE — 99396 PREV VISIT EST AGE 40-64: CPT | Performed by: FAMILY MEDICINE

## 2022-01-17 NOTE — PROGRESS NOTES
Please inform patient,  all results including blood count, kidney and liver function, thyroid profile, cholesterol, average blood sugar ( diabetes screening), urine, are normal    Keep doing good job with diet and exercise and follow-up in 3 months for virtual visit to continue with controlled drug prescription, phentermine. Thanks

## 2022-01-17 NOTE — PATIENT INSTRUCTIONS
Well Visit, Ages 25 to 48: Care Instructions  Overview     Well visits can help you stay healthy. Your doctor has checked your overall health and may have suggested ways to take good care of yourself. Your doctor also may have recommended tests. At home, you can help prevent illness with healthy eating, regular exercise, and other steps. Follow-up care is a key part of your treatment and safety. Be sure to make and go to all appointments, and call your doctor if you are having problems. It's also a good idea to know your test results and keep a list of the medicines you take. How can you care for yourself at home? · Get screening tests that you and your doctor decide on. Screening helps find diseases before any symptoms appear. · Eat healthy foods. Choose fruits, vegetables, whole grains, protein, and low-fat dairy foods. Limit fat, especially saturated fat. Reduce salt in your diet. · Limit alcohol. If you are a man, have no more than 2 drinks a day or 14 drinks a week. If you are a woman, have no more than 1 drink a day or 7 drinks a week. · Get at least 30 minutes of physical activity on most days of the week. Walking is a good choice. You also may want to do other activities, such as running, swimming, cycling, or playing tennis or team sports. Discuss any changes in your exercise program with your doctor. · Reach and stay at a healthy weight. This will lower your risk for many problems, such as obesity, diabetes, heart disease, and high blood pressure. · Do not smoke or allow others to smoke around you. If you need help quitting, talk to your doctor about stop-smoking programs and medicines. These can increase your chances of quitting for good. · Care for your mental health. It is easy to get weighed down by worry and stress. Learn strategies to manage stress, like deep breathing and mindfulness, and stay connected with your family and community.  If you find you often feel sad or hopeless, talk with your doctor. Treatment can help. · Talk to your doctor about whether you have any risk factors for sexually transmitted infections (STIs). You can help prevent STIs if you wait to have sex with a new partner (or partners) until you've each been tested for STIs. It also helps if you use condoms (male or female condoms) and if you limit your sex partners to one person who only has sex with you. Vaccines are available for some STIs, such as HPV. · Use birth control if it's important to you to prevent pregnancy. Talk with your doctor about the choices available and what might be best for you. · If you think you may have a problem with alcohol or drug use, talk to your doctor. This includes prescription medicines (such as amphetamines and opioids) and illegal drugs (such as cocaine and methamphetamine). Your doctor can help you figure out what type of treatment is best for you. · Protect your skin from too much sun. When you're outdoors from 10 a.m. to 4 p.m., stay in the shade or cover up with clothing and a hat with a wide brim. Wear sunglasses that block UV rays. Even when it's cloudy, put broad-spectrum sunscreen (SPF 30 or higher) on any exposed skin. · See a dentist one or two times a year for checkups and to have your teeth cleaned. · Wear a seat belt in the car. When should you call for help? Watch closely for changes in your health, and be sure to contact your doctor if you have any problems or symptoms that concern you. Where can you learn more? Go to http://www.[employer].com/  Enter P072 in the search box to learn more about \"Well Visit, Ages 25 to 48: Care Instructions. \"  Current as of: February 11, 2021               Content Version: 13.0  © 8891-4962 Healthwise, Incorporated. Care instructions adapted under license by [employer] (which disclaims liability or warranty for this information).  If you have questions about a medical condition or this instruction, always ask your healthcare professional. [address] [address] any warranty or liability for your use of this information.

## 2022-01-17 NOTE — PROGRESS NOTES
Chief Complaint   Patient presents with    Complete Physical     follow up       1. \"Have you been to the ER, urgent care clinic since your last visit? Hospitalized since your last visit? \" No    2. \"Have you seen or consulted any other health care providers outside of the [address] [place] [address] since your last visit? \" No     3. For patients over 45: Has the patient had a colonoscopy? Yes - Care Gap present. Rooming MA/LPN to request most recent results cologuard tets     If the patient is female:    4. For patients over 40: Has the patient had a mammogram? Yes - no Care Gap present    5. For patients over 21: Has the patient had a pap smear? Yes - no Care Gap present     3 most recent PHQ Screens 1/17/2022   Little interest or pleasure in doing things Not at all   Feeling down, depressed, irritable, or hopeless Not at all   Total Score PHQ 2 0         Abuse Screening Questionnaire 9/2/2020   Do you ever feel afraid of your partner? N   Are you in a relationship with someone who physically or mentally threatens you? N   Is it safe for you to go home?  Y          Health Maintenance Due   Topic Date Due    Hepatitis C Screening  Never done    Colorectal Cancer Screening Combo  Never done    DTaP/Tdap/Td series (2 - Td or Tdap) 09/01/2021

## 2022-01-17 NOTE — PROGRESS NOTES
HISTORY OF PRESENT ILLNESS  Hany Dyer is a 40 y.o. female. Patient was seen today for complete physical exam and age appropriate HM and blood work. HPI  Health Maintenance  Immunizations:   Influenza: up to date. Tetanus: unknown, record requested. Shingles: n/a. Pneumonia: n/a. Cancer screening:   Cervical: reviewed guidelines, UTD, done by OBGYN, records requested. Breast: reviewed guidelines, up to date. Colon: reviewed guidelines, cologuard done by Gyn, last month, result requested. Obesity  Patient complains of obesity. Patient cites health, increased physical ability as reasons for wanting to lose weight.     Obesity History  Weight in late teens: 150 lb.   Period of greatest weight gain: 50 lb during early thirtiees  Lowest adult weight: 172  Highest adult weight: 206  Amount of time at present weight: 184  lb for last 2 months     History of Weight Loss Efforts  Greatest amount of weight lost: 15 lb over 4 months  Amount of time that loss was maintained: 3 months  Circumstances associated with regain of weight: stopped exercise and diet plan  Successful weight loss techniques attempted: prescription appetite suppressants:   Unsuccessful weight loss techniques attempted: self-directed dieting, OTC appetite suppressants:      Current Exercise Habits no regular exercise     Current Eating Habits  Number of regular meals per day: 2  Number of snacking episodes per day: 2  Who shops for food? patient  Who prepares food? patient and spouse  Who eats with patient? patient and spouse  Binge behavior?: no  Purge behavior? no  Anorexic behavior? no  Eating precipitated by stress? no  Guilt feelings associated with eating? yes -      Other Potential Contributing Factors  Use of alcohol: average 0 drinks/week  Use of medications that may cause weight gain none  History of past abuse? none  Psych History: not significant  Comorbidities: sleep apnea/hypopnea, osteoarthritis     She is on Metformin and Phentermine that she is not taking consistently. She had sleep study done in 10/2020   HSAT demonstrated mild sleep disordered breathing characterized by an overall AHI of 8.5/h associated with minimal SaO2 of 81%.  Events more prominent supine with a supinerelated AHI of 13.2/h.  Snoring during 4.7% of the study. Patient Care Team:  Addie Snider MD as PCP - General (Family Medicine)  Addie Snider MD as PCP - [facility] Empaneled Provider  Other, MD Boy Álvarez MD (Obstetrics & Gynecology)      The following sections were reviewed & updated as appropriate: PMH, PSH, FH, and SH. Review of Systems   Constitutional: Negative for chills, fever and malaise/fatigue. HENT: Negative for congestion, ear pain, sore throat and tinnitus. Eyes: Negative for blurred vision, double vision, pain and discharge. Respiratory: Negative for cough, shortness of breath and wheezing. Cardiovascular: Negative for chest pain, palpitations and leg swelling. Gastrointestinal: Negative for abdominal pain, blood in stool, constipation, diarrhea, nausea and vomiting. Genitourinary: Negative for dysuria, frequency, hematuria and urgency. Musculoskeletal: Negative for back pain, joint pain and myalgias. Skin: Negative for rash. Neurological: Negative for dizziness, tremors, seizures and headaches. Endo/Heme/Allergies: Negative for polydipsia. Does not bruise/bleed easily. Psychiatric/Behavioral: Negative for depression and substance abuse. The patient is not nervous/anxious. Physical Exam  Vitals and nursing note reviewed. Constitutional:       Appearance: She is well-developed. HENT:      Head: Normocephalic and atraumatic. Right Ear: External ear normal.      Left Ear: External ear normal.      Nose: Nose normal.   Eyes:      General: No scleral icterus. Conjunctiva/sclera: Conjunctivae normal.      Pupils: Pupils are equal, round, and reactive to light. Neck:      Thyroid: No thyromegaly. Vascular: No JVD. Cardiovascular:      Rate and Rhythm: Normal rate and regular rhythm. Heart sounds: Normal heart sounds. No murmur heard. No friction rub. No gallop. Pulmonary:      Effort: Pulmonary effort is normal.      Breath sounds: Normal breath sounds. No wheezing or rales. Chest:      Chest wall: No tenderness. Breasts: Breasts are symmetrical.      Right: No inverted nipple, mass, nipple discharge, skin change or tenderness. Left: No inverted nipple, mass, nipple discharge, skin change or tenderness. Abdominal:      General: Bowel sounds are normal. There is no distension. Palpations: Abdomen is soft. There is no mass. Tenderness: There is no abdominal tenderness. Musculoskeletal:         General: No tenderness. Normal range of motion. Cervical back: Normal range of motion and neck supple. Lymphadenopathy:      Cervical: No cervical adenopathy. Skin:     General: Skin is warm and dry. Findings: No rash. Neurological:      Mental Status: She is alert and oriented to person, place, and time. Cranial Nerves: No cranial nerve deficit. Deep Tendon Reflexes: Reflexes are normal and symmetric. Psychiatric:         Behavior: Behavior normal.         Thought Content: Thought content normal.         Judgment: Judgment normal.         ASSESSMENT and PLAN  Diagnoses and all orders for this visit:    1. Routine general medical examination at a health care facility  -     CBC WITH AUTOMATED DIFF; Future  -     HEMOGLOBIN A1C WITH EAG; Future  -     LIPID PANEL; Future  -     METABOLIC PANEL, COMPREHENSIVE; Future  -     TSH 3RD GENERATION; Future  -     URINALYSIS W/ RFLX MICROSCOPIC; Future    2. Obesity (BMI 30.0-34. 9)  Assessment & Plan:   borderline controlled, continue current medications, continue current treatment plan, medication adherence emphasized, lifestyle modifications recommended      3.  Screening for viral disease  -     HEPATITIS C AB; Future    Discussed lifestyle issues and health guidance given  Patient was given an after visit summary which includes diagnoses, vital signs, current medications, instructions and references & authorized prescriptions . Results of labs will be conveyed to patient, once available. Pt verbalized instructions I provided and expressed understanding of discussion that was held today. Follow-up and Dispositions    · Return in about 3 months (around 4/17/2022), or weight management, for virtual.         Please note that this dictation was completed with [employer], the [employer] voice recognition software. Quite often unanticipated grammatical, syntax, homophones, and other interpretive errors are inadvertently transcribed by the computer software. Please disregard these errors. Please excuse any errors that have escaped final proofreading. Thank you.

## 2022-01-17 NOTE — ASSESSMENT & PLAN NOTE
borderline controlled, continue current medications, continue current treatment plan, medication adherence emphasized, lifestyle modifications recommended

## 2022-01-17 NOTE — PROGRESS NOTES
Called patient. Two patient identifiers verified. Discussed lab results per provider's note. She Verbalized understanding.

## 2022-02-09 DIAGNOSIS — E66.9 OBESITY (BMI 30.0-34.9): ICD-10-CM

## 2022-02-09 RX ORDER — PHENTERMINE HYDROCHLORIDE 30 MG/1
30 CAPSULE ORAL DAILY
Qty: 30 CAPSULE | Refills: 0 | Status: SHIPPED | OUTPATIENT
Start: 2022-02-09 | End: 2022-03-11 | Stop reason: SDUPTHER

## 2022-03-11 DIAGNOSIS — E66.01 CLASS 2 SEVERE OBESITY DUE TO EXCESS CALORIES WITH SERIOUS COMORBIDITY AND BODY MASS INDEX (BMI) OF 36.0 TO 36.9 IN ADULT (HCC): ICD-10-CM

## 2022-03-11 DIAGNOSIS — E66.9 OBESITY (BMI 30.0-34.9): ICD-10-CM

## 2022-03-11 DIAGNOSIS — Z71.3 WEIGHT LOSS COUNSELING, ENCOUNTER FOR: ICD-10-CM

## 2022-03-11 RX ORDER — PHENTERMINE HYDROCHLORIDE 30 MG/1
30 CAPSULE ORAL DAILY
Qty: 30 CAPSULE | Refills: 0 | Status: SHIPPED | OUTPATIENT
Start: 2022-03-11 | End: 2022-04-26 | Stop reason: SDUPTHER

## 2022-03-11 RX ORDER — METFORMIN HYDROCHLORIDE 500 MG/1
500 TABLET ORAL 2 TIMES DAILY WITH MEALS
Qty: 180 TABLET | Refills: 1 | Status: SHIPPED | OUTPATIENT
Start: 2022-03-11 | End: 2022-08-19

## 2022-03-11 NOTE — TELEPHONE ENCOUNTER
MD Lashawn Kirk for [facility] mail order for new rx for phentermine and metformin. Check . Update to 90 d/s? Thanks, Dirk Lazo    Last Visit: 1/17/22 MD Tiffanie Riley  Next Appointment: Not scheduled  Previous Refill Encounter(s):   [facility]  [facility]formin 11/30/21 [facility] + [facility]    Requested Prescriptions     Pending Prescriptions Disp Refills    phentermine 30 mg capsule 30 Capsule 0     Sig: Take 1 Capsule by mouth daily. Max Daily Amount: 30 mg.    metFORMIN (GLUCOPHAGE) 500 mg tablet 180 Tablet 1     Sig: Take 1 Tablet by mouth two (2) times daily (with meals).

## 2022-03-14 ENCOUNTER — TELEPHONE (OUTPATIENT)
Dept: FAMILY MEDICINE CLINIC | Age: 47
End: 2022-03-14

## 2022-03-14 DIAGNOSIS — Z71.3 WEIGHT LOSS COUNSELING, ENCOUNTER FOR: ICD-10-CM

## 2022-03-14 DIAGNOSIS — E66.9 OBESITY (BMI 30.0-34.9): ICD-10-CM

## 2022-03-14 DIAGNOSIS — E66.01 CLASS 2 SEVERE OBESITY DUE TO EXCESS CALORIES WITH SERIOUS COMORBIDITY AND BODY MASS INDEX (BMI) OF 36.0 TO 36.9 IN ADULT (HCC): ICD-10-CM

## 2022-03-14 RX ORDER — PHENTERMINE HYDROCHLORIDE 30 MG/1
30 CAPSULE ORAL DAILY
Qty: 30 CAPSULE | Refills: 0 | OUTPATIENT
Start: 2022-03-14

## 2022-03-14 RX ORDER — METFORMIN HYDROCHLORIDE 500 MG/1
500 TABLET ORAL 2 TIMES DAILY WITH MEALS
Qty: 180 TABLET | Refills: 1 | OUTPATIENT
Start: 2022-03-14

## 2022-03-14 NOTE — TELEPHONE ENCOUNTER
Attempted to call patient. left message on voicemail to return call back.  Please inform pt that Rx has been sent to the pharmacy

## 2022-03-19 PROBLEM — Z71.3 WEIGHT LOSS COUNSELING, ENCOUNTER FOR: Status: ACTIVE | Noted: 2021-01-28

## 2022-03-19 PROBLEM — E66.811 OBESITY (BMI 30.0-34.9): Status: ACTIVE | Noted: 2020-09-02

## 2022-03-19 PROBLEM — E66.9 OBESITY (BMI 30.0-34.9): Status: ACTIVE | Noted: 2020-09-02

## 2022-03-30 ENCOUNTER — OFFICE VISIT (OUTPATIENT)
Dept: SLEEP MEDICINE | Age: 47
End: 2022-03-30
Payer: COMMERCIAL

## 2022-03-30 ENCOUNTER — DOCUMENTATION ONLY (OUTPATIENT)
Dept: SLEEP MEDICINE | Age: 47
End: 2022-03-30

## 2022-03-30 VITALS
SYSTOLIC BLOOD PRESSURE: 129 MMHG | BODY MASS INDEX: 34.74 KG/M2 | WEIGHT: 184 LBS | HEIGHT: 61 IN | OXYGEN SATURATION: 99 % | HEART RATE: 85 BPM | DIASTOLIC BLOOD PRESSURE: 79 MMHG

## 2022-03-30 DIAGNOSIS — G47.33 OSA (OBSTRUCTIVE SLEEP APNEA): Primary | ICD-10-CM

## 2022-03-30 PROCEDURE — 99213 OFFICE O/P EST LOW 20 MIN: CPT | Performed by: SPECIALIST

## 2022-03-30 RX ORDER — CALCIUM CARBONATE/VITAMIN D3 600 MG-125
TABLET ORAL
COMMUNITY

## 2022-03-30 NOTE — PROGRESS NOTES
217 [facility] [place]. [place], [address]  Tel.  [phone]  Fax. 100 [facility] 60  [place], [address] [facility]  Tel.  [phone]  Fax. [phone] 9250 [facility] Lolita Kim   Tel.  [phone]  Fax. [phone]         Chief Complaint       Chief Complaint   Patient presents with    Sleep Problem     patient was referred for OAT but patient said she never followed up. She said she is having lot of sleep problems         HPI        Ayush Lam is a 56 y.o. female seen for follow-up. She was evaluated with a HSAT which demonstrated mild sleep disordered breathing characterized by an overall AHI of 8.5/h associated with minimal SaO2 of 81%. Events more prominent supine with a supinerelated AHI of 13.2/h. Snoring during 4.7% of the study. Treatment options reviewed. Was referred for OAT. Study demonstrated sleep disordered breathing characterized by an overall AHI of 8.5/h with minimal SaO2 81%. Events more prominently supine with a supinerelated AHI of 13.2/h. Snoring during 4.7%. Oral appliance was recommended. Patient did not pursue that option. She returns been told of more prominent snoring. Notes fatigue on awakening and during the day. [place] Sleepiness Scale: 14.      Allergies   Allergen Reactions    Quine Itching       Current Outpatient Medications   Medication Sig Dispense Refill    calcium-cholecalciferol, d3, (CALCIUM 600 + D) 600-125 mg-unit tab Take  by mouth.  phentermine 30 mg capsule Take 1 Capsule by mouth daily. Max Daily Amount: 30 mg. 30 Capsule 0    metFORMIN (GLUCOPHAGE) 500 mg tablet Take 1 Tablet by mouth two (2) times daily (with meals). 180 Tablet 1    om 3/E/linol/ala/oleic/gla/lip (OMEGA 3-6-9 PO) Take  by mouth.           She  has no past medical history of Abnormal Pap smear, Abuse, Acquired hypothyroidism, Anemia NEC, Arrhythmia, Arthritis, Asthma, Asthma, Autoimmune disease ([facility] [address] CAD (coronary artery disease), Calculus of kidney, Cancer ([facility] [address] Chronic kidney disease, Chronic pain, Complication of anesthesia, Congestive heart failure, unspecified, Contact dermatitis and other eczema, due to unspecified cause, COPD, Depression, Diabetes ([address] Diabetes mellitus, Essential hypertension, Genital herpes, unspecified, GERD (gastroesophageal reflux disease), Headache(784.0), Heart abnormalities, Herpes gestationis, Herpes simplex without mention of complication, Human immunodeficiency virus (HIV) disease ([facility] [address] OTHER MEDICAL, Hypercholesterolemia, Hypertension, Infertility, Kidney disease, Liver disease, Phlebitis and thrombophlebitis of unspecified site, Postpartum depression, Psychiatric problem, Psychotic disorder ([facility] [address] PUD (peptic ulcer disease), Rhesus isoimmunization unspecified as to episode of care in pregnancy, Seizures ([facility] [address] Sickle-cell disease, unspecified, Stroke ([facility] [address] Systemic lupus erythematosus ([facility] [address] Thromboembolus ([facility] [address] Thyroid disease, Trauma, Unspecified breast disorder, Unspecified diseases of blood and blood-forming organs, or Unspecified epilepsy without mention of intractable epilepsy. She  has a past surgical history that includes hx appendectomy and hx other surgical.    She family history includes Arthritis-rheumatoid in her maternal grandmother and paternal grandmother; Diabetes in her father; Hypertension in her father and mother. She  reports that she has never smoked. She has never used smokeless tobacco. She reports current alcohol use. She reports that she does not use drugs. Review of Systems:  Unchanged per patient      Objective:     Visit Vitals  /79   Pulse 85   Ht 5' 1\" (1.549 m)   Wt 184 lb (83.5 kg)   SpO2 99%   BMI 34.77 kg/m²     Body mass index is 34.77 kg/m².      General:   Conversant, cooperative   Eyes:  Pupils equal and reactive, no nystagmus   Oropharynx:   Mallampati score IV, tongue mildly scalloped, narrow posterior oral airway Chest/Lungs:  Clear on auscultation    CVS:  Normal rate, regular rhythm        Neuro:  Speech fluent, face symmetrical             Assessment:       ICD-10-CM ICD-9-CM    1. AVELINA (obstructive sleep apnea)  G47.33 327.23 AMB SUPPLY ORDER     History of sleep disordered breathing, more prominent when supine. Patient does have a narrow posterior oral airway. Treatment options were discussed. APAP chronic 15 cm will be started given current excessive daytime sleepiness when [place] Sleepiness Scale of 14. Plan:     Orders Placed This Encounter    AMB SUPPLY ORDER     Diagnosis: Obstructive Sleep Apnea ICD-10 Code (G47.33)     Positive Airway Pressure Therapy: Duration of need: 99 months. ResMed APAP Device or patient preference: Minimum Pressure: 5 cmH2O, Maximum Pressure: 15 cmH2O. CPAP mask -  Patient preference, headgear, heated tubing, and filter;  heated humidifier. Wireless modem. Remote monitoring enrollment.  Oral/Nasal Combo Mask 1 every 3 months.  Oral Cushion Combo Mask (Replace) 2 per month.  Nasal Pillows Combo Mask (Replace) 2 per month.  Full Face Mask 1 every 3 months.  Full Face Mask Cushion 1 per month.  Nasal Cushion (Replace) 2 per month.  Nasal Pillows (Replace) 2 per month.  Nasal Interface Mask 1 every 3 months.  Headgear 1 every 6 months.  Chinstrap 1 every 6 months.  Tubing 1 every 3 months.  Filter(s) Disposable 2 per month.  Filter(s) Non-Disposable 1 every 6 months.  Oral Interface 1 every 3 months. [facility] [employer] (Replace) 1 every 6 months.  Tubing with heating element 1 every 3 months.                 Ana Herman MD, [facility]  Diplomate, American Board of Sleep Medicine  NPI [id number]  Electronically signed 3/30/22       *A copy of HSAT was provided to the patient and reviewed in detail. *APAP will be started  at the above pressure settings.   The patient is to contact the office if there are problems with either mask or pressure settings. Follow-up will be scheduled at which time compliance data will be reviewed. * Patient has a history and examination consistent with the diagnosis of sleep apnea. * Sleep testing reviewed  * She was provided information on sleep apnea including corresponding risk factors and the importance of proper treatment. * Treatment options if indicated were reviewed today. Navdeep Arceo MD, FAASM  Electronically signed 03/30/22        This note was created using voice recognition software. Despite editing, there may be syntax errors. This note will not be viewable in [address]

## 2022-04-26 ENCOUNTER — VIRTUAL VISIT (OUTPATIENT)
Dept: FAMILY MEDICINE CLINIC | Age: 47
End: 2022-04-26
Payer: COMMERCIAL

## 2022-04-26 DIAGNOSIS — F51.01 PRIMARY INSOMNIA: ICD-10-CM

## 2022-04-26 DIAGNOSIS — G89.29 CHRONIC MIDLINE LOW BACK PAIN WITHOUT SCIATICA: ICD-10-CM

## 2022-04-26 DIAGNOSIS — M54.50 CHRONIC MIDLINE LOW BACK PAIN WITHOUT SCIATICA: ICD-10-CM

## 2022-04-26 DIAGNOSIS — G47.33 OBSTRUCTIVE SLEEP APNEA SYNDROME: ICD-10-CM

## 2022-04-26 DIAGNOSIS — E66.9 OBESITY (BMI 30.0-34.9): ICD-10-CM

## 2022-04-26 DIAGNOSIS — Z71.3 WEIGHT LOSS COUNSELING, ENCOUNTER FOR: Primary | ICD-10-CM

## 2022-04-26 PROCEDURE — 99213 OFFICE O/P EST LOW 20 MIN: CPT | Performed by: FAMILY MEDICINE

## 2022-04-26 RX ORDER — GABAPENTIN 300 MG/1
1 CAPSULE ORAL 2 TIMES DAILY
COMMUNITY
Start: 2022-04-11

## 2022-04-26 RX ORDER — PHENTERMINE HYDROCHLORIDE 30 MG/1
30 CAPSULE ORAL DAILY
Qty: 30 CAPSULE | Refills: 0 | Status: SHIPPED | OUTPATIENT
Start: 2022-04-26 | End: 2022-05-24 | Stop reason: SDUPTHER

## 2022-04-26 NOTE — PATIENT INSTRUCTIONS
Learning About Sleeping Well  What does sleeping well mean? Sleeping well means getting enough sleep to feel good and stay healthy. How much sleep is enough varies among people. The number of hours you sleep and how you feel when you wake up are both important. If you do not feel refreshed, you probably need more sleep. Another sign of not getting enough sleep is feeling tired during the day. Experts recommend that adults get at least 7 or more hours of sleep per day. Children and older adults need more sleep. Why is getting enough sleep important? Getting enough quality sleep is a basic part of good health. When your sleep suffers, your physical health, mood, and your thoughts can suffer too. You may find yourself feeling more grumpy or stressed. Not getting enough sleep also can lead to serious problems, including injury, accidents, anxiety, and depression. What might cause poor sleeping? Many things can cause sleep problems, including:  · Changes to your sleep schedule. · Stress. Stress can be caused by fear about a single event, such as giving a speech. Or you may have ongoing stress, such as worry about work or school. · Depression, anxiety, and other mental or emotional conditions. · Changes in your sleep habits or surroundings. This includes changes that happen where you sleep, such as noise, light, or sleeping in a different bed. It also includes changes in your sleep pattern, such as having jet lag or working a late shift. · Health problems, such as pain, breathing problems, and restless legs syndrome. · Lack of regular exercise. · Using alcohol, nicotine, or caffeine before bed. How can you help yourself? Here are some tips that may help you sleep more soundly and wake up feeling more refreshed. Your sleeping area   · Use your bedroom only for sleeping and sex. A bit of light reading may help you fall asleep. But if it doesn't, do your reading elsewhere in the house.  Try not to use your TV, computer, smartphone, or tablet while you are in bed. · Be sure your bed is big enough to stretch out comfortably, especially if you have a sleep partner. · Keep your bedroom quiet, dark, and cool. Use curtains, blinds, or a sleep mask to block out light. To block out noise, use earplugs, soothing music, or a \"white noise\" machine. Your evening and bedtime routine   · Create a relaxing bedtime routine. You might want to take a warm shower or bath, or listen to soothing music. · Go to bed at the same time every night. And get up at the same time every morning, even if you feel tired. What to avoid   · Limit caffeine (coffee, tea, caffeinated sodas) during the day, and don't have any for at least 6 hours before bedtime. · Avoid drinking alcohol before bedtime. Alcohol can cause you to wake up more often during the night. · Try not to smoke or use tobacco, especially in the evening. Nicotine can keep you awake. · Limit naps during the day, especially close to bedtime. · Avoid lying in bed awake for too long. If you can't fall asleep or if you wake up in the middle of the night and can't get back to sleep within about 20 minutes, get out of bed and go to another room until you feel sleepy. · Avoid taking medicine right before bed that may keep you awake or make you feel hyper or energized. Your doctor can tell you if your medicine may do this and if you can take it earlier in the day. If you can't sleep   · Imagine yourself in a peaceful, pleasant scene. Focus on the details and feelings of being in a place that is relaxing. · Get up and do a quiet or boring activity until you feel sleepy. · Avoid drinking any liquids before going to bed to help prevent waking up often to use the bathroom. Where can you learn more? Go to http://daniela-monie.info/  Enter M206 in the search box to learn more about \"Learning About Sleeping Well. \"  Current as of: June 16, 2021               Content Version: 13.2  © 8149-4044 Healthwise, Incorporated. Care instructions adapted under license by [employer] (which disclaims liability or warranty for this information). If you have questions about a medical condition or this instruction, always ask your healthcare professional. [address] any warranty or liability for your use of this information.

## 2022-04-26 NOTE — PROGRESS NOTES
Rafael Jones is a 56 y.o. female who was seen by synchronous (real-time) audio-video technology on 4/26/2022 for Weight Management and Sleep Problem    Patient was actually scheduled for follow-up on weight management and refill on medication but she was also requesting work excuse as she has not slept for last 3 days and feeling very groggy and irritable. She does night shift. Assessment & Plan:   Diagnoses and all orders for this visit:    1. Weight loss counseling, encounter for  -     phentermine 30 mg capsule; Take 1 Capsule by mouth daily. Max Daily Amount: 30 mg.    2. Obstructive sleep apnea syndrome    3. Primary insomnia    4. Chronic midline low back pain without sciatica    5. Obesity (BMI 30.0-34.9)  -     phentermine 30 mg capsule; Take 1 Capsule by mouth daily. Max Daily Amount: 30 mg. Work excuse for 3 days was given to patient can sleep and recover. She is waiting for her CPAP machine and hoping to sleep better. She is scheduled for back surgery on June 7. Subjective:        Obesity  Patient complains of obesity. Patient cites health, increased physical ability as reasons for wanting to lose weight.     Obesity History  Weight in late teens: 150 lb.   Period of greatest weight gain: 50 lb during early thirtiees  Lowest adult weight: 172  Highest adult weight: 206  Amount of time at present weight: 184  lb for last 2 months     History of Weight Loss Efforts  Greatest amount of weight lost: 15 lb over 4 months  Amount of time that loss was maintained: 3 months  Circumstances associated with regain of weight: stopped exercise and diet plan  Successful weight loss techniques attempted: prescription appetite suppressants:   Unsuccessful weight loss techniques attempted: self-directed dieting, OTC appetite suppressants:      Current Exercise Habits no regular exercise     Current Eating Habits  Number of regular meals per day: 2  Number of snacking episodes per day: 2  Who shops for food? patient  Who prepares food? patient and spouse  Who eats with patient? patient and spouse  Binge behavior?: no  Purge behavior? no  Anorexic behavior? no  Eating precipitated by stress? no  Guilt feelings associated with eating? yes -      Other Potential Contributing Factors  Use of alcohol: average 0 drinks/week  Use of medications that may cause weight gain none  History of past abuse? none  Psych History: not significant  Comorbidities: sleep apnea/hypopnea, osteoarthritis     She is on Metformin and Phentermine that she is not taking consistently. She had sleep study done in 10/2020   HSAT demonstrated mild sleep disordered breathing characterized by an overall AHI of 8.5/h associated with minimal SaO2 of 81%.  Events more prominent supine with a supinerelated AHI of 13.2/h.  Snoring during 4.7% of the study. Her [place] sleepiness scale was 14  She followed up with sleep medicine recently and CPAP supply was ordered. Her adjustment will be at 15 cm. According to patient she has just slept around 3 hours for last few days and feeling very groggy and irritable. She works in [employer] and do night shifts. She is also following Dr. Cookie Danielle, for chronic lumbar DJD and she is scheduled for back surgery in June. She has been recently started on gabapentin. Prior to Admission medications    Medication Sig Start Date End Date Taking? Authorizing Provider   phentermine 30 mg capsule Take 1 Capsule by mouth daily. Max Daily Amount: 30 mg. 4/26/22  Yes Drew Bryan MD   calcium-cholecalciferol, d3, (CALCIUM 600 + D) 600-125 mg-unit tab Take  by mouth. Yes Provider, Historical   metFORMIN (GLUCOPHAGE) 500 mg tablet Take 1 Tablet by mouth two (2) times daily (with meals). 3/11/22  Yes Drew Bryan MD   om 3/E/linol/ala/oleic/gla/lip (OMEGA 3-6-9 PO) Take  by mouth. Yes Provider, Historical   gabapentin (NEURONTIN) 300 mg capsule Take 1 Capsule by mouth two (2) times a day.  4/11/22 Provider, Historical     Patient Active Problem List    Diagnosis Date Noted    Weight loss counseling, encounter for 01/28/2021    Obesity (BMI 30.0-34.9) 09/02/2020     Current Outpatient Medications   Medication Sig Dispense Refill    phentermine 30 mg capsule Take 1 Capsule by mouth daily. Max Daily Amount: 30 mg. 30 Capsule 0    calcium-cholecalciferol, d3, (CALCIUM 600 + D) 600-125 mg-unit tab Take  by mouth.  metFORMIN (GLUCOPHAGE) 500 mg tablet Take 1 Tablet by mouth two (2) times daily (with meals). 180 Tablet 1    om 3/E/linol/ala/oleic/gla/lip (OMEGA 3-6-9 PO) Take  by mouth.  gabapentin (NEURONTIN) 300 mg capsule Take 1 Capsule by mouth two (2) times a day. Allergies   Allergen Reactions    Quine Itching     No past medical history on file. Past Surgical History:   Procedure Laterality Date    HX APPENDECTOMY      HX OTHER SURGICAL      appendectomy     Family History   Problem Relation Age of Onset    Hypertension Mother     Diabetes Father     Hypertension Father     Arthritis-rheumatoid Maternal Grandmother     Arthritis-rheumatoid Paternal Grandmother      Social History     Tobacco Use    Smoking status: Never Smoker    Smokeless tobacco: Never Used   Substance Use Topics    Alcohol use: Yes     Comment: RARE       Review of Systems   Constitutional: Negative for chills, fever and malaise/fatigue. HENT: Negative for congestion, ear pain, sore throat and tinnitus. Eyes: Negative for blurred vision, double vision, pain and discharge. Respiratory: Negative for cough, shortness of breath and wheezing. Cardiovascular: Negative for chest pain, palpitations and leg swelling. Gastrointestinal: Negative for abdominal pain, blood in stool, constipation, diarrhea, nausea and vomiting. Genitourinary: Negative for dysuria, frequency, hematuria and urgency. Musculoskeletal: Negative for back pain, joint pain and myalgias. Skin: Negative for rash.    Neurological: Positive for headaches. Negative for dizziness, tremors and seizures. Endo/Heme/Allergies: Negative for polydipsia. Does not bruise/bleed easily. Psychiatric/Behavioral: Negative for depression and substance abuse. The patient has insomnia. The patient is not nervous/anxious. Objective:     Patient-Reported Vitals 4/26/2022   Patient-Reported Weight 181lb   Patient-Reported Height -        [INSTRUCTIONS:  \"[x]\" Indicates a positive item  \"[]\" Indicates a negative item  -- DELETE ALL ITEMS NOT EXAMINED]    Constitutional: [x] Appears well-developed and well-nourished [x] No apparent distress      [] Abnormal -     Mental status: [x] Alert and awake  [x] Oriented to person/place/time [x] Able to follow commands    [] Abnormal -     Eyes:   EOM    [x]  Normal    [] Abnormal -   Sclera  [x]  Normal    [] Abnormal -          Discharge [x]  None visible   [] Abnormal -     HENT: [x] Normocephalic, atraumatic  [] Abnormal -   [x] Mouth/Throat: Mucous membranes are moist    External Ears [x] Normal  [] Abnormal -    Neck: [x] No visualized mass [] Abnormal -     Pulmonary/Chest: [x] Respiratory effort normal   [x] No visualized signs of difficulty breathing or respiratory distress        [] Abnormal -      Musculoskeletal:   [x] Normal gait with no signs of ataxia         [x] Normal range of motion of neck        [] Abnormal -     Neurological:        [x] No Facial Asymmetry (Cranial nerve 7 motor function) (limited exam due to video visit)          [x] No gaze palsy        [] Abnormal -          Skin:        [x] No significant exanthematous lesions or discoloration noted on facial skin         [] Abnormal -            Psychiatric:       [x] Normal Affect [] Abnormal -        [x] No Hallucinations    Other pertinent observable physical exam findings:-        We discussed the expected course, resolution and complications of the diagnosis(es) in detail.   Medication risks, benefits, costs, interactions, and alternatives were discussed as indicated. I advised her to contact the office if her condition worsens, changes or fails to improve as anticipated. She expressed understanding with the diagnosis(es) and plan. Hany Mirza, was evaluated through a synchronous (real-time) audio-video encounter. The patient (or guardian if applicable) is aware that this is a billable service, which includes applicable co-pays. Verbal consent to proceed has been obtained. The visit was conducted pursuant to the emergency declaration under the [address] [place] [address], [address] [place] [address] authority and the [employer] Act. Patient identification was verified, and a caregiver was present when appropriate. The patient was located at home in a state where the provider was licensed to provide care.       Fabio Liao MD

## 2022-04-26 NOTE — LETTER
NOTIFICATION RETURN TO WORK     4/26/2022 1:16 PM    Ms. Michaelle Osei  [address] [address]      To Whom It May Concern:    Michaelle Osei is currently under the care of NURYS Zuleta. She will return to work on: 05/02/22    If there are questions or concerns please have the patient contact our office.         Sincerely,      Urmila Alexander MD

## 2022-05-10 ENCOUNTER — OFFICE VISIT (OUTPATIENT)
Dept: FAMILY MEDICINE CLINIC | Age: 47
End: 2022-05-10
Payer: COMMERCIAL

## 2022-05-10 VITALS
TEMPERATURE: 97.5 F | DIASTOLIC BLOOD PRESSURE: 85 MMHG | BODY MASS INDEX: 35.34 KG/M2 | SYSTOLIC BLOOD PRESSURE: 134 MMHG | HEART RATE: 92 BPM | RESPIRATION RATE: 8 BRPM | HEIGHT: 61 IN | OXYGEN SATURATION: 99 % | WEIGHT: 187.2 LBS

## 2022-05-10 DIAGNOSIS — M20.41 HAMMER TOES OF BOTH FEET: ICD-10-CM

## 2022-05-10 DIAGNOSIS — M20.42 HAMMER TOES OF BOTH FEET: ICD-10-CM

## 2022-05-10 DIAGNOSIS — M21.612 BUNION OF GREAT TOE OF LEFT FOOT: ICD-10-CM

## 2022-05-10 DIAGNOSIS — Z71.3 WEIGHT LOSS COUNSELING, ENCOUNTER FOR: ICD-10-CM

## 2022-05-10 DIAGNOSIS — Z01.818 PREOP GENERAL PHYSICAL EXAM: Primary | ICD-10-CM

## 2022-05-10 PROCEDURE — 99214 OFFICE O/P EST MOD 30 MIN: CPT | Performed by: FAMILY MEDICINE

## 2022-05-10 RX ORDER — TOPIRAMATE 50 MG/1
50 TABLET, FILM COATED ORAL DAILY
Qty: 90 TABLET | Refills: 0 | Status: SHIPPED | OUTPATIENT
Start: 2022-05-10 | End: 2022-05-16

## 2022-05-10 NOTE — PROGRESS NOTES
HISTORY OF PRESENT ILLNESS  Hany De La Fuente is a 56 y.o. female. Patient was seen today for preop physical exam, blood work and to complete paperwork for upcoming surgeries for hammertoes and bunion. She also wants to consider other medications for weight loss. HPI  HPI:   Capo Zhu is 56 y.o. female (1975) who presents for preoperative evaluation. Procedure/Surgery: Bilateral fifth digit proximal interphalangeal joint arthroplasty and left hallux exostectomy   Date of Procedure/Surgery: 6/7/22  Surgeon: dr Kylah Manzo: [facility]  Primary Physician: Reyna Georges MD       Reason for surgery: Bilateral hammertoes along with left great toe bunion with worsening pain    Latex Allergy: None  Anesthesia Complications: None  History of abnormal bleeding : None  History of Blood Transfusions: no  Health Care Directive or Living Will: no  Recent use of: No recent use of aspirin (ASA), NSAIDS or steroids  Tetanus up to date: tetanus status unknown to the patient        Obesity  Patient complains of obesity. Patient cites health, increased physical ability as reasons for wanting to lose weight.     Obesity History  Weight in late teens: 150 lb.   Period of greatest weight gain: 50 lb during early thirtiees  Lowest adult weight: 172  Highest adult weight: 206  Amount of time at present weight: 187  lb for last 2 weeks     History of Weight Loss Efforts  Greatest amount of weight lost: 15 lb over 4 months  Amount of time that loss was maintained: 3 months  Circumstances associated with regain of weight: stopped exercise and diet plan  Successful weight loss techniques attempted: prescription appetite suppressants:   Unsuccessful weight loss techniques attempted: self-directed dieting, OTC appetite suppressants:      Current Exercise Habits no regular exercise     Current Eating Habits  Number of regular meals per day: 2  Number of snacking episodes per day: 2  Who shops for food? patient  Who prepares food? patient and spouse  Who eats with patient? patient and spouse  Binge behavior?: no  Purge behavior? no  Anorexic behavior? no  Eating precipitated by stress? no  Guilt feelings associated with eating? yes -      Other Potential Contributing Factors  Use of alcohol: average 0 drinks/week  Use of medications that may cause weight gain none  History of past abuse? none  Psych History: not significant  Comorbidities: sleep apnea/hypopnea, osteoarthritis     She is on Metformin and Phentermine        ---------------------------------------     Prior to Admission medications    Medication Sig Start Date End Date Taking? Authorizing Provider   topiramate (TOPAMAX) 50 mg tablet Take 1 Tablet by mouth daily. 5/10/22  Yes Maite Orozco MD   gabapentin (NEURONTIN) 300 mg capsule Take 1 Capsule by mouth two (2) times a day. 4/11/22  Yes Provider, Historical   phentermine 30 mg capsule Take 1 Capsule by mouth daily. Max Daily Amount: 30 mg. 4/26/22  Yes Maite Orozco MD   calcium-cholecalciferol, d3, (CALCIUM 600 + D) 600-125 mg-unit tab Take  by mouth. Yes Provider, Historical   metFORMIN (GLUCOPHAGE) 500 mg tablet Take 1 Tablet by mouth two (2) times daily (with meals). 3/11/22  Yes Maite Orozco MD   om 3/E/linol/ala/oleic/gla/lip (OMEGA 3-6-9 PO) Take  by mouth. Yes Provider, Historical          Allergies   Allergen Reactions    Quine Itching        Patient Active Problem List    Diagnosis Date Noted    Weight loss counseling, encounter for 01/28/2021    Obesity (BMI 30.0-34.9) 09/02/2020        History reviewed. No pertinent past medical history.     Past Surgical History:   Procedure Laterality Date    HX APPENDECTOMY      HX OTHER SURGICAL      appendectomy       Social History     Tobacco Use    Smoking status: Never Smoker    Smokeless tobacco: Never Used   Substance Use Topics    Alcohol use: Yes     Comment: RARE --------------------------------------    Review of Systems   Constitutional: Negative for chills, fever and malaise/fatigue. HENT: Negative for congestion, ear pain, sore throat and tinnitus. Eyes: Negative for blurred vision, double vision, pain and discharge. Respiratory: Negative for cough, shortness of breath and wheezing. Cardiovascular: Negative for chest pain, palpitations and leg swelling. Gastrointestinal: Negative for abdominal pain, blood in stool, constipation, diarrhea, nausea and vomiting. Genitourinary: Negative for dysuria, frequency, hematuria and urgency. Musculoskeletal: Negative for back pain, joint pain and myalgias. Pain in both little toes and left great toe   Skin: Negative for rash. Neurological: Negative for dizziness, tremors, seizures and headaches. Endo/Heme/Allergies: Negative for polydipsia. Does not bruise/bleed easily. Psychiatric/Behavioral: Negative for depression and substance abuse. The patient has insomnia. The patient is not nervous/anxious. Physical Exam  Vitals and nursing note reviewed. Constitutional:       Appearance: She is well-developed. She is not diaphoretic. HENT:      Head: Normocephalic and atraumatic. Right Ear: External ear normal.      Mouth/Throat:      Pharynx: No oropharyngeal exudate. Eyes:      General: No scleral icterus. Conjunctiva/sclera: Conjunctivae normal.      Pupils: Pupils are equal, round, and reactive to light. Neck:      Thyroid: No thyromegaly. Vascular: No JVD. Cardiovascular:      Rate and Rhythm: Normal rate and regular rhythm. Heart sounds: Normal heart sounds. No murmur heard. Pulmonary:      Effort: Pulmonary effort is normal.      Breath sounds: Normal breath sounds. No wheezing. Abdominal:      General: Bowel sounds are normal. There is no distension. Palpations: Abdomen is soft. There is no mass. Musculoskeletal:         General: No tenderness.  Normal range of motion. Cervical back: Normal range of motion and neck supple. Right foot: Bunion present. Left foot: Bunion present. Feet:      Comments: Bilateral hammertoes of both little toes  Lymphadenopathy:      Cervical: No cervical adenopathy. Skin:     General: Skin is warm and dry. Findings: No rash. Neurological:      Mental Status: She is alert and oriented to person, place, and time. Cranial Nerves: No cranial nerve deficit. Deep Tendon Reflexes: Reflexes are normal and symmetric. Reflexes normal.         ASSESSMENT and PLAN  Diagnoses and all orders for this visit:    1. Preop general physical exam  -     CBC WITH AUTOMATED DIFF; Future  -     METABOLIC PANEL, BASIC; Future    2. Bunion of great toe of left foot    3. Hammer toes of both feet    4. Weight loss counseling, encounter for  -     topiramate (TOPAMAX) 50 mg tablet; Take 1 Tablet by mouth daily. After reviewing the patient's history & exam she is low risk for cardiac complications. No contraindications to planned surgery   Added topiramate to boost her weight loss. Discussed lifestyle issues and health guidance given  Patient was given an after visit summary which includes diagnoses, vital signs, current medications, instructions and references & authorized prescriptions . Results of labs will be conveyed to patient, once available. Pt verbalized instructions I provided and expressed understanding of discussion that was held today. Please note that this dictation was completed with [employer]mob, the computer voice recognition software. Quite often unanticipated grammatical, syntax, homophones, and other interpretive errors are inadvertently transcribed by the computer software. Please disregard these errors. Please excuse any errors that have escaped final proofreading. Thank you.

## 2022-05-10 NOTE — PROGRESS NOTES
Chief Complaint   Patient presents with    Pre-op Exam     PIPJ arthroplasty         1. \"Have you been to the ER, urgent care clinic since your last visit? Hospitalized since your last visit? \" No    2. \"Have you seen or consulted any other health care providers outside of the [address] [place] [address] since your last visit? \" No     3. For patients aged 39-70: Has the patient had a colonoscopy / FIT/ Cologuard? No      If the patient is female:    4. For patients aged 41-77: Has the patient had a mammogram within the past 2 years? Yes - no Care Gap present      5. For patients aged 21-65: Has the patient had a pap smear?  Yes - no Care Gap present         3 most recent PHQ Screens 1/17/2022   Little interest or pleasure in doing things Not at all   Feeling down, depressed, irritable, or hopeless Not at all   Total Score PHQ 2 0       Health Maintenance Due   Topic Date Due    Colorectal Cancer Screening Combo  Never done    DTaP/Tdap/Td series (2 - Td or Tdap) 09/01/2021

## 2022-05-10 NOTE — PATIENT INSTRUCTIONS
Surgery for Hammer Toe: Before Your Surgery  What is surgery for hammer toe? Hammer toe surgery straightens a curled toe that causes problems and does not get better with other treatment. Your doctor will make one or more small cuts on your deformed toe joint. These cuts are called incisions. Your doctor will make them to release the tendons that are holding your toe. Then he or she may remove pieces of bone. Your toe may be held in place, such as with a pin or wire. The pin or wire may stay in your toe, or it may be removed after about 3 to 6 weeks. Sometimes a pin is placed so that it sticks out the end of your toe. Then it can be removed without another surgery. Most people can go home right after this surgery. You may be able to go back to your normal routine within 3 to 6 weeks. But it may take longer to recover. You should be able to walk on your toe within a day after surgery. But you may need crutches for a few days. How do you prepare for surgery? Surgery can be stressful. This information will help you understand what you can expect. And it will help you safely prepare for surgery. Preparing for surgery    · Be sure you have someone to take you home. Anesthesia and pain medicine will make it unsafe for you to drive or get home on your own.     · Understand exactly what surgery is planned, along with the risks, benefits, and other options.     · If you take aspirin or some other blood thinner, ask your doctor if you should stop taking it before your surgery. Make sure that you understand exactly what your doctor wants you to do. These medicines increase the risk of bleeding.     · Tell your doctor ALL the medicines, vitamins, supplements, and herbal remedies you take. Some may increase the risk of problems during your surgery.  Your doctor will tell you if you should stop taking any of them before the surgery and how soon to do it.     · Make sure your doctor and the hospital have a copy of your advance directive. If you don't have one, you may want to prepare one. It lets others know your health care wishes. It's a good thing to have before any type of surgery or procedure. What happens on the day of surgery?    · Follow the instructions exactly about when to stop eating and drinking. If you don't, your surgery may be canceled. If your doctor told you to take your medicines on the day of surgery, take them with only a sip of water.     · Follow your doctor's instructions about when to bathe or shower before your surgery. Do not apply lotions, perfumes, deodorants, or nail polish.     · Do not shave the surgical site yourself.     · Take off all jewelry and piercings. And take out contact lenses, if you wear them.     · Wear clothing that is easy to put on and take off. You may have a large bandage on your foot. At the hospital or surgery center    · Bring a picture ID.     · The area for surgery is often marked to make sure there are no errors.     · You will be kept comfortable and safe by your anesthesia provider. The anesthesia may make you sleep. Or it may just numb the area being worked on.     · The surgery will take about 30 to 60 minutes. When should you call your doctor? · You have questions or concerns.     · You don't understand how to prepare for your surgery.     · You become ill before the surgery (such as fever, flu, or a cold).     · You need to reschedule or have changed your mind about having the surgery. Where can you learn more? Go to http://www.gray.com/  Enter E911 in the search box to learn more about \"Surgery for Hammer Toe: Before Your Surgery. \"  Current as of: July 1, 2021               Content Version: 13.2  © 8360-3186 [employer]. Care instructions adapted under license by [employer] (which disclaims liability or warranty for this information).  If you have questions about a medical condition or this instruction, always ask your healthcare professional. [place] [address] any warranty or liability for your use of this information. Bunion Removal: Before Your Surgery  What is bunion surgery? Bunion surgery (bunionectomy) removes a lump of bone from your foot. This lump is called a bunion. It forms on the joint where your big toe joins your foot. The surgery will also straighten your big toe. Your doctor will make one or more small cuts near your toe joint. These cuts are called incisions. The doctor will remove small pieces of bone and may straighten your toe. This is done by cutting the bone and setting it in a new position. Your toe may be held in place with pins, screws, wires, or staples. These may stay in your toe. Or they may be removed after a few weeks. The surgery will leave scars that fade with time. The surgery may make walking easier. It may reduce stiffness, pain, or swelling in your toe joint. It may also improve the way your toe looks. Your doctor will give you medicine to help you relax and to numb your foot. Or you may get medicine to put you to sleep. You will probably go home on the day of your surgery. If your surgery is more complex, you may need to spend the night in the hospital.  How soon you can put weight on your toe depends on how complex your surgery is. It may take 6 weeks or longer before swelling goes down and you have healed enough to return to your normal routine. You may have some swelling and pain for as long as 6 months to a year. How do you prepare for surgery? Surgery can be stressful. This information will help you understand what you can expect. And it will help you safely prepare for surgery. Preparing for surgery    · Be sure you have someone to take you home. Anesthesia and pain medicine will make it unsafe for you to drive or get home on your own.     · Understand exactly what surgery is planned, along with the risks, benefits, and other options.   · If you take aspirin or some other blood thinner, ask your doctor if you should stop taking it before your surgery. Make sure that you understand exactly what your doctor wants you to do. These medicines increase the risk of bleeding.     · Tell your doctor ALL the medicines, vitamins, supplements, and herbal remedies you take. Some may increase the risk of problems during your surgery. Your doctor will tell you if you should stop taking any of them before the surgery and how soon to do it.     · Make sure your doctor and the hospital have a copy of your advance directive. If you don't have one, you may want to prepare one. It lets others know your health care wishes. It's a good thing to have before any type of surgery or procedure. What happens on the day of surgery? · Follow the instructions exactly about when to stop eating and drinking. If you don't, your surgery may be canceled. If your doctor told you to take your medicines on the day of surgery, take them with only a sip of water.     · Take a bath or shower before you come in for your surgery. Do not apply lotions, perfumes, deodorants, or nail polish.     · Do not shave the surgical site yourself.     · Wear clothing that is easy to put on and take off. You may have a large bandage on your foot.     · Take off all jewelry and piercings. And take out contact lenses, if you wear them. At the hospital or surgery center   · Bring a picture ID.     · The area for surgery is often marked to make sure there are no errors.     · You will be kept comfortable and safe by your anesthesia provider. The anesthesia may make you sleep. Or it may just numb the area being worked on.     · The surgery will usually take about 1 to 1½ hours. If you are having both feet done at the same time, it may take 2 to 3 hours. When should you call your doctor?    · You have questions or concerns.     · You don't understand how to prepare for your surgery.     · You become ill before the surgery (such as fever, flu, or a cold).     · You need to reschedule or have changed your mind about having the surgery. Where can you learn more? Go to http://www.gray.com/  Enter W451 in the search box to learn more about \"Bunion Removal: Before Your Surgery. \"  Current as of: July 1, 2021               Content Version: 13.2  © 2006-2022 [employer]wise, [facility]. Care instructions adapted under license by [employer] (which disclaims liability or warranty for this information). If you have questions about a medical condition or this instruction, always ask your healthcare professional. [address] any warranty or liability for your use of this information.

## 2022-05-11 LAB
ANION GAP SERPL CALC-SCNC: 4 MMOL/L (ref 5–15)
BASOPHILS # BLD: 0 K/UL (ref 0–0.1)
BASOPHILS NFR BLD: 1 % (ref 0–1)
BUN SERPL-MCNC: 12 MG/DL (ref 6–20)
BUN/CREAT SERPL: 16 (ref 12–20)
CALCIUM SERPL-MCNC: 9.6 MG/DL (ref 8.5–10.1)
CHLORIDE SERPL-SCNC: 105 MMOL/L (ref 97–108)
CO2 SERPL-SCNC: 28 MMOL/L (ref 21–32)
CREAT SERPL-MCNC: 0.74 MG/DL (ref 0.55–1.02)
DIFFERENTIAL METHOD BLD: NORMAL
EOSINOPHIL # BLD: 0.1 K/UL (ref 0–0.4)
EOSINOPHIL NFR BLD: 1 % (ref 0–7)
ERYTHROCYTE [DISTWIDTH] IN BLOOD BY AUTOMATED COUNT: 14 % (ref 11.5–14.5)
GLUCOSE SERPL-MCNC: 77 MG/DL (ref 65–100)
HCT VFR BLD AUTO: 42.8 % (ref 35–47)
HGB BLD-MCNC: 13.2 G/DL (ref 11.5–16)
IMM GRANULOCYTES # BLD AUTO: 0 K/UL (ref 0–0.04)
IMM GRANULOCYTES NFR BLD AUTO: 0 % (ref 0–0.5)
LYMPHOCYTES # BLD: 2.2 K/UL (ref 0.8–3.5)
LYMPHOCYTES NFR BLD: 43 % (ref 12–49)
MCH RBC QN AUTO: 27.6 PG (ref 26–34)
MCHC RBC AUTO-ENTMCNC: 30.8 G/DL (ref 30–36.5)
MCV RBC AUTO: 89.4 FL (ref 80–99)
MONOCYTES # BLD: 0.4 K/UL (ref 0–1)
MONOCYTES NFR BLD: 9 % (ref 5–13)
NEUTS SEG # BLD: 2.3 K/UL (ref 1.8–8)
NEUTS SEG NFR BLD: 46 % (ref 32–75)
NRBC # BLD: 0 K/UL (ref 0–0.01)
NRBC BLD-RTO: 0 PER 100 WBC
PLATELET # BLD AUTO: 223 K/UL (ref 150–400)
PMV BLD AUTO: 12.4 FL (ref 8.9–12.9)
POTASSIUM SERPL-SCNC: 4.2 MMOL/L (ref 3.5–5.1)
RBC # BLD AUTO: 4.79 M/UL (ref 3.8–5.2)
SODIUM SERPL-SCNC: 137 MMOL/L (ref 136–145)
WBC # BLD AUTO: 5 K/UL (ref 3.6–11)

## 2022-05-11 NOTE — PROGRESS NOTES
Called patient. Two patient identifiers verified. Discussed lab results per provider's note. She Verbalized understanding. Informed that results have been faxed and she was appreciative.

## 2022-05-11 NOTE — PROGRESS NOTES
Sana Leach,  Your blood count and kidney function are within normal.  Good to go for surgery. I have completed your papers and faxed to surgeon's office.   Thanks

## 2022-05-16 DIAGNOSIS — Z71.3 WEIGHT LOSS COUNSELING, ENCOUNTER FOR: ICD-10-CM

## 2022-05-16 RX ORDER — TOPIRAMATE 50 MG/1
TABLET, FILM COATED ORAL
Qty: 90 TABLET | Refills: 0 | Status: SHIPPED | OUTPATIENT
Start: 2022-05-16 | End: 2022-07-15 | Stop reason: SDUPTHER

## 2022-05-24 ENCOUNTER — VIRTUAL VISIT (OUTPATIENT)
Dept: FAMILY MEDICINE CLINIC | Age: 47
End: 2022-05-24
Payer: COMMERCIAL

## 2022-05-24 DIAGNOSIS — E66.9 OBESITY (BMI 30.0-34.9): ICD-10-CM

## 2022-05-24 DIAGNOSIS — Z71.3 WEIGHT LOSS COUNSELING, ENCOUNTER FOR: Primary | ICD-10-CM

## 2022-05-24 PROBLEM — M54.16 LUMBAR RADICULOPATHY: Status: ACTIVE | Noted: 2019-09-09

## 2022-05-24 PROCEDURE — 99212 OFFICE O/P EST SF 10 MIN: CPT | Performed by: FAMILY MEDICINE

## 2022-05-24 RX ORDER — PHENTERMINE HYDROCHLORIDE 30 MG/1
30 CAPSULE ORAL DAILY
Qty: 30 CAPSULE | Refills: 0 | Status: SHIPPED | OUTPATIENT
Start: 2022-05-24 | End: 2022-07-15 | Stop reason: SDUPTHER

## 2022-05-24 NOTE — PROGRESS NOTES
Lenka Norris is a 56 y.o. female who was seen by synchronous (real-time) audio-video technology on 5/24/2022 for Letter for School/Work (If she can have a letter mentioning that she is on medicines that compromise her immunity)        Assessment & Plan:   Diagnoses and all orders for this visit:    1. Weight loss counseling, encounter for  -     phentermine 30 mg capsule; Take 1 Capsule by mouth daily. Max Daily Amount: 30 mg.    2. Obesity (BMI 30.0-34.9)  -     phentermine 30 mg capsule; Take 1 Capsule by mouth daily. Max Daily Amount: 30 mg. Explained patient that she does not have any qualifying underlying medical condition or she is not on any medications that can compromise her immunity. Patient verbalized my recommendations. Subjective:   Patient is working in [employer] [facility] [employer].  She has upcoming foot surgery scheduled on June 7. Recently due to surge in [employer] cases, her posting is in [facility]. She is stressed and nervous that she will get COVID before her foot surgery and her surgery will be canceled. She needs a documentation from physician and letter stating that she has medical condition or certain medications that compromises her immunity and making her at high risk of COVID. Prior to Admission medications    Medication Sig Start Date End Date Taking? Authorizing Provider   topiramate (TOPAMAX) 50 mg tablet TAKE 1/2 TABLET BY MOUTH FOR 2 WEEKS AND THEN TAKE 1 TABLET BY MOUTH DAILY 5/16/22  Yes Dixon Severin, MD   gabapentin (NEURONTIN) 300 mg capsule Take 1 Capsule by mouth two (2) times a day. 4/11/22  Yes Provider, Historical   phentermine 30 mg capsule Take 1 Capsule by mouth daily. Max Daily Amount: 30 mg. 4/26/22  Yes Dixon Severin, MD   calcium-cholecalciferol, d3, (CALCIUM 600 + D) 600-125 mg-unit tab Take  by mouth. Yes Provider, Historical   metFORMIN (GLUCOPHAGE) 500 mg tablet Take 1 Tablet by mouth two (2) times daily (with meals).  3/11/22  Yes Dixon Severin, MD   om 3/E/linol/ala/oleic/gla/lip (OMEGA 3-6-9 PO) Take  by mouth. Yes Provider, Historical     Patient Active Problem List    Diagnosis Date Noted    Weight loss counseling, encounter for 01/28/2021    Obesity (BMI 30.0-34.9) 09/02/2020    Lumbar radiculopathy 09/09/2019     Current Outpatient Medications   Medication Sig Dispense Refill    phentermine 30 mg capsule Take 1 Capsule by mouth daily. Max Daily Amount: 30 mg. 30 Capsule 0    topiramate (TOPAMAX) 50 mg tablet TAKE 1/2 TABLET BY MOUTH FOR 2 WEEKS AND THEN TAKE 1 TABLET BY MOUTH DAILY 90 Tablet 0    gabapentin (NEURONTIN) 300 mg capsule Take 1 Capsule by mouth two (2) times a day.  calcium-cholecalciferol, d3, (CALCIUM 600 + D) 600-125 mg-unit tab Take  by mouth.  metFORMIN (GLUCOPHAGE) 500 mg tablet Take 1 Tablet by mouth two (2) times daily (with meals). 180 Tablet 1    om 3/E/linol/ala/oleic/gla/lip (OMEGA 3-6-9 PO) Take  by mouth. Allergies   Allergen Reactions    Quine Itching     No past medical history on file. Past Surgical History:   Procedure Laterality Date    HX APPENDECTOMY      HX OTHER SURGICAL      appendectomy     Family History   Problem Relation Age of Onset    Hypertension Mother     Diabetes Father     Hypertension Father     Arthritis-rheumatoid Maternal Grandmother     Arthritis-rheumatoid Paternal Grandmother      Social History     Tobacco Use    Smoking status: Never Smoker    Smokeless tobacco: Never Used   Substance Use Topics    Alcohol use: Yes     Comment: RARE       Review of Systems   Constitutional: Negative for chills, fever and malaise/fatigue. HENT: Negative for congestion, ear pain, sore throat and tinnitus. Eyes: Negative for blurred vision, double vision, pain and discharge. Respiratory: Negative for cough, shortness of breath and wheezing. Cardiovascular: Negative for chest pain, palpitations and leg swelling.    Gastrointestinal: Negative for abdominal pain, blood in stool, constipation, diarrhea, nausea and vomiting. Genitourinary: Negative for dysuria, frequency, hematuria and urgency. Musculoskeletal: Negative for back pain, joint pain and myalgias. Skin: Negative for rash. Neurological: Negative for dizziness, tremors, seizures and headaches. Endo/Heme/Allergies: Negative for polydipsia. Does not bruise/bleed easily. Psychiatric/Behavioral: Negative for depression and substance abuse. The patient is not nervous/anxious.         Objective:     Patient-Reported Vitals 5/24/2022   Patient-Reported Weight 184   Patient-Reported Height 5.3        [INSTRUCTIONS:  \"[x]\" Indicates a positive item  \"[]\" Indicates a negative item  -- DELETE ALL ITEMS NOT EXAMINED]    Constitutional: [x] Appears well-developed and well-nourished [x] No apparent distress      [] Abnormal -     Mental status: [x] Alert and awake  [x] Oriented to person/place/time [x] Able to follow commands    [] Abnormal -     Eyes:   EOM    [x]  Normal    [] Abnormal -   Sclera  [x]  Normal    [] Abnormal -          Discharge [x]  None visible   [] Abnormal -     HENT: [x] Normocephalic, atraumatic  [] Abnormal -   [x] Mouth/Throat: Mucous membranes are moist    External Ears [x] Normal  [] Abnormal -    Neck: [x] No visualized mass [] Abnormal -     Pulmonary/Chest: [x] Respiratory effort normal   [x] No visualized signs of difficulty breathing or respiratory distress        [] Abnormal -      Musculoskeletal:   [x] Normal gait with no signs of ataxia         [x] Normal range of motion of neck        [] Abnormal -     Neurological:        [x] No Facial Asymmetry (Cranial nerve 7 motor function) (limited exam due to video visit)          [x] No gaze palsy        [] Abnormal -          Skin:        [x] No significant exanthematous lesions or discoloration noted on facial skin         [] Abnormal -            Psychiatric:       [x] Normal Affect [] Abnormal -        [x] No Hallucinations    Other pertinent observable physical exam findings:-        We discussed the expected course, resolution and complications of the diagnosis(es) in detail. Medication risks, benefits, costs, interactions, and alternatives were discussed as indicated. I advised her to contact the office if her condition worsens, changes or fails to improve as anticipated. She expressed understanding with the diagnosis(es) and plan. Hany Mirza, was evaluated through a synchronous (real-time) audio-video encounter. The patient (or guardian if applicable) is aware that this is a billable service, which includes applicable co-pays. Verbal consent to proceed has been obtained. The visit was conducted pursuant to the emergency declaration under the [facility], [address] [place] [address] authority and the [employer] and [facility] General Act. Patient identification was verified, and a caregiver was present when appropriate. The patient was located at home in a state where the provider was licensed to provide care.     This service was provided through telehealth, between anderson Rudolph Harjit morton from [place] and Radha Gonzalez MD from [facility]     I discussed with the patient the nature of our telemedicine visits, that:      I would evaluate the patient and recommend diagnostics and treatments based on my assessment   Our sessions are not being recorded and that personal health information is protected   Our team would provide follow up care in person if/when the patient needs it    Anitha Liu MD

## 2022-07-15 DIAGNOSIS — E66.9 OBESITY (BMI 30.0-34.9): ICD-10-CM

## 2022-07-15 DIAGNOSIS — Z71.3 WEIGHT LOSS COUNSELING, ENCOUNTER FOR: ICD-10-CM

## 2022-07-15 RX ORDER — TOPIRAMATE 50 MG/1
TABLET, FILM COATED ORAL
Qty: 90 TABLET | Refills: 0 | Status: SHIPPED | OUTPATIENT
Start: 2022-07-15 | End: 2022-10-02 | Stop reason: SDUPTHER

## 2022-07-15 RX ORDER — PHENTERMINE HYDROCHLORIDE 30 MG/1
30 CAPSULE ORAL DAILY
Qty: 30 CAPSULE | Refills: 0 | Status: SHIPPED | OUTPATIENT
Start: 2022-07-15 | End: 2022-08-16 | Stop reason: SDUPTHER

## 2022-08-16 ENCOUNTER — OFFICE VISIT (OUTPATIENT)
Dept: FAMILY MEDICINE CLINIC | Age: 47
End: 2022-08-16
Payer: COMMERCIAL

## 2022-08-16 VITALS
HEIGHT: 61 IN | RESPIRATION RATE: 16 BRPM | WEIGHT: 186.8 LBS | HEART RATE: 85 BPM | OXYGEN SATURATION: 99 % | TEMPERATURE: 98.2 F | DIASTOLIC BLOOD PRESSURE: 77 MMHG | BODY MASS INDEX: 35.27 KG/M2 | SYSTOLIC BLOOD PRESSURE: 121 MMHG

## 2022-08-16 DIAGNOSIS — E66.9 OBESITY (BMI 30.0-34.9): ICD-10-CM

## 2022-08-16 DIAGNOSIS — Z71.3 WEIGHT LOSS COUNSELING, ENCOUNTER FOR: Primary | ICD-10-CM

## 2022-08-16 DIAGNOSIS — R53.82 CHRONIC FATIGUE: ICD-10-CM

## 2022-08-16 DIAGNOSIS — Z23 ENCOUNTER FOR IMMUNIZATION: ICD-10-CM

## 2022-08-16 PROCEDURE — 99214 OFFICE O/P EST MOD 30 MIN: CPT | Performed by: FAMILY MEDICINE

## 2022-08-16 PROCEDURE — 90471 IMMUNIZATION ADMIN: CPT | Performed by: FAMILY MEDICINE

## 2022-08-16 PROCEDURE — 90715 TDAP VACCINE 7 YRS/> IM: CPT | Performed by: FAMILY MEDICINE

## 2022-08-16 RX ORDER — IBUPROFEN 800 MG/1
TABLET ORAL
COMMUNITY
Start: 2022-06-28

## 2022-08-16 RX ORDER — PHENTERMINE HYDROCHLORIDE 30 MG/1
30 CAPSULE ORAL DAILY
Qty: 30 CAPSULE | Refills: 0 | Status: SHIPPED | OUTPATIENT
Start: 2022-08-16 | End: 2022-09-13 | Stop reason: SDUPTHER

## 2022-08-16 NOTE — PROGRESS NOTES
Chief Complaint   Patient presents with    Medication Refill     Follow up         1. \"Have you been to the ER, urgent care clinic since your last visit? Hospitalized since your last visit? \" No    2. \"Have you seen or consulted any other health care providers outside of the [address] [place] [address] since your last visit? \" No     3. For patients aged 39-70: Has the patient had a colonoscopy / FIT/ Cologuard? No      If the patient is female:    4. For patients aged 41-77: Has the patient had a mammogram within the past 2 years? No      5. For patients aged 21-65: Has the patient had a pap smear?  No         3 most recent PHQ Screens 8/16/2022   Little interest or pleasure in doing things Not at all   Feeling down, depressed, irritable, or hopeless Not at all   Total Score PHQ 2 0       Health Maintenance Due   Topic Date Due    Colorectal Cancer Screening Combo  Never done    DTaP/Tdap/Td series (2 - Td or Tdap) 09/01/2021

## 2022-08-16 NOTE — PROGRESS NOTES
HISTORY OF PRESENT ILLNESS  Hany Tucker is a 56 y.o. female. Patient was seen today for follow-up on weight management and weight loss. Today she complains of extreme fatigue and tiredness. She has right foot surgery completed in early June. Still having significant pain in right foot and not able to exercise as expected. She has not lost weight due to not exercising and walking after her surgery. HPI  Obesity  Patient complains of obesity. Patient cites health, increased physical ability as reasons for wanting to lose weight. Obesity History  Weight in late teens: 150 lb. Period of greatest weight gain: 50 lb during early thirtiees  Lowest adult weight: 172  Highest adult weight: 206  Amount of time at present weight: 186  lb for last 2 months     History of Weight Loss Efforts  Greatest amount of weight lost: 15 lb over 4 months  Amount of time that loss was maintained: 3 months  Circumstances associated with regain of weight: stopped exercise and diet plan  Successful weight loss techniques attempted: prescription appetite suppressants:   Unsuccessful weight loss techniques attempted: self-directed dieting, OTC appetite suppressants:      Current Exercise Habits no regular exercise     Current Eating Habits  Number of regular meals per day: 2  Number of snacking episodes per day: 2  Who shops for food? patient  Who prepares food? patient and spouse  Who eats with patient? patient and spouse  Binge behavior?: no  Purge behavior? no  Anorexic behavior? no  Eating precipitated by stress? no  Guilt feelings associated with eating? yes -      Other Potential Contributing Factors  Use of alcohol: average 0 drinks/week  Use of medications that may cause weight gain none  History of past abuse? none  Psych History: not significant  Comorbidities: sleep apnea/hypopnea, osteoarthritis     She is on Metformin , topiramate and phentermine.   She had sleep study done in 10/2020   HSAT demonstrated mild sleep disordered breathing characterized by an overall AHI of 8.5/h associated with minimal SaO2 of 81%. Events more prominent supine with a supine-related AHI of 13.2/h. Snoring during 4.7% of the study. Her [place] sleepiness scale was 14  She followed up with sleep medicine recently and CPAP supply was ordered. Her adjustment will be at 15 cm. Fatigue  Patient complains of fatigue. Symptoms began problem is longstanding. Sentinal symptom the patient feels fatigue began with: witnessed or suspected sleep apnea. Symptoms of her fatigue have been general malaise, fatigue with paradoxical insomnia, feelings of depression, lack of interest in usual activities, diffuse soft tissue aches and pains. Patient describes the following psychologic symptoms:  Having depression symptoms associated with persistent pain in right foot . Patient denies fever, significant change in weight, unusual rashes, cold intolerance, constipation and change in hair texture., GI blood loss, excessive menstrual bleeding. The course has been gradually worsening. Severity has been struggles to carry out day to day responsibilities. . Previous visits for this problem: yes, last seen 4 months ago by me. Review of Systems   Constitutional:  Positive for malaise/fatigue. Negative for chills and fever. HENT:  Negative for congestion, ear pain, sore throat and tinnitus. Eyes:  Negative for blurred vision, double vision, pain and discharge. Respiratory:  Negative for cough, shortness of breath and wheezing. Cardiovascular:  Negative for chest pain, palpitations and leg swelling. Gastrointestinal:  Negative for abdominal pain, blood in stool, constipation, diarrhea, nausea and vomiting. Genitourinary:  Negative for dysuria, frequency, hematuria and urgency. Musculoskeletal:  Negative for back pain, joint pain and myalgias. Pain in right foot   Skin:  Negative for rash.    Neurological:  Negative for dizziness, tremors, seizures and headaches. Endo/Heme/Allergies:  Negative for polydipsia. Does not bruise/bleed easily. Psychiatric/Behavioral:  Negative for depression and substance abuse. The patient is not nervous/anxious. Physical Exam  Vitals and nursing note reviewed. Constitutional:       Appearance: She is well-developed. She is not diaphoretic. HENT:      Head: Normocephalic and atraumatic. Right Ear: External ear normal.      Mouth/Throat:      Mouth: Mucous membranes are moist.      Pharynx: No oropharyngeal exudate. Eyes:      General: No scleral icterus. Conjunctiva/sclera: Conjunctivae normal.      Pupils: Pupils are equal, round, and reactive to light. Neck:      Thyroid: No thyromegaly. Vascular: No JVD. Cardiovascular:      Rate and Rhythm: Normal rate and regular rhythm. Heart sounds: Normal heart sounds. No murmur heard. Pulmonary:      Effort: Pulmonary effort is normal.      Breath sounds: Normal breath sounds. No wheezing. Abdominal:      General: Bowel sounds are normal. There is no distension. Palpations: Abdomen is soft. There is no mass. Musculoskeletal:         General: No tenderness. Normal range of motion. Cervical back: Normal range of motion and neck supple. Lymphadenopathy:      Cervical: No cervical adenopathy. Skin:     General: Skin is warm and dry. Findings: No rash. Neurological:      Mental Status: She is alert and oriented to person, place, and time. Cranial Nerves: No cranial nerve deficit. Deep Tendon Reflexes: Reflexes are normal and symmetric. Reflexes normal.   Psychiatric:         Mood and Affect: Mood normal.         Behavior: Behavior normal.       ASSESSMENT and PLAN  Diagnoses and all orders for this visit:    1. Weight loss counseling, encounter for  -     phentermine 30 mg capsule; Take 1 Capsule by mouth in the morning. Max Daily Amount: 30 mg.    2. Obesity (BMI 30.0-34.9)  -     phentermine 30 mg capsule;  Take 1 Capsule by mouth in the morning. Max Daily Amount: 30 mg.    3. Encounter for immunization  -     MO IMMUNIZ ADMIN,1 SINGLE/COMB VAC/TOXOID  -     TDAP, BOOSTRIX, (AGE 10 YRS+), IM    4. Chronic fatigue  -     VITAMIN D, 25 HYDROXY; Future  -     VITAMIN B12; Future  -     IRON PROFILE; Future  Will check patient for vitamin deficiencies as well as iron deficiency. Strongly recommended to continue with CPAP machine as sleep apnea can also lead to fatigue. Medications were refilled for weight loss. Tdap was updated. Discussed lifestyle issues and health guidance given  Patient was given an after visit summary which includes diagnoses, vital signs, current medications, instructions and references & authorized prescriptions . Results of labs will be conveyed to patient, once available. Pt verbalized instructions I provided and expressed understanding of discussion that was held today. Follow-up and Dispositions    Return in about 4 months (around 12/16/2022) for physical, fasting. Please note that this dictation was completed with [employer], the computer voice recognition software. Quite often unanticipated grammatical, syntax, homophones, and other interpretive errors are inadvertently transcribed by the computer software. Please disregard these errors. Please excuse any errors that have escaped final proofreading. Thank you.

## 2022-08-17 LAB
25(OH)D3 SERPL-MCNC: 38.9 NG/ML (ref 30–100)
IRON SATN MFR SERPL: 28 % (ref 20–50)
IRON SERPL-MCNC: 97 UG/DL (ref 35–150)
TIBC SERPL-MCNC: 342 UG/DL (ref 250–450)
VIT B12 SERPL-MCNC: 1045 PG/ML (ref 193–986)

## 2022-08-19 DIAGNOSIS — E66.01 CLASS 2 SEVERE OBESITY DUE TO EXCESS CALORIES WITH SERIOUS COMORBIDITY AND BODY MASS INDEX (BMI) OF 36.0 TO 36.9 IN ADULT (HCC): ICD-10-CM

## 2022-08-19 DIAGNOSIS — Z71.3 WEIGHT LOSS COUNSELING, ENCOUNTER FOR: ICD-10-CM

## 2022-08-19 RX ORDER — METFORMIN HYDROCHLORIDE 500 MG/1
500 TABLET ORAL 2 TIMES DAILY WITH MEALS
Qty: 180 TABLET | Refills: 1 | Status: SHIPPED | OUTPATIENT
Start: 2022-08-19 | End: 2022-10-12

## 2022-09-13 DIAGNOSIS — E66.9 OBESITY (BMI 30.0-34.9): ICD-10-CM

## 2022-09-13 DIAGNOSIS — Z71.3 WEIGHT LOSS COUNSELING, ENCOUNTER FOR: ICD-10-CM

## 2022-09-13 NOTE — TELEPHONE ENCOUNTER
Patient called requesting refill out completely. .  Requested Prescriptions     Pending Prescriptions Disp Refills    phentermine 30 mg capsule 30 Capsule 0     Sig: Take 1 Capsule by mouth daily. Max Daily Amount: 30 mg.       Best call back #[phone]

## 2022-09-13 NOTE — TELEPHONE ENCOUNTER
Patient call stating she is completely out of phentermine. Tried to contact [facility] to check if rx on 8/16/22 was filled but no one answered. Check . Thanks, [facility]    Last Visit: Cheryl Naylor  Next Appointment: None  Previous Refill Encounter(s): 8/16/22 30    Requested Prescriptions     Pending Prescriptions Disp Refills    phentermine 30 mg capsule 30 Capsule 0     Sig: Take 1 Capsule by mouth daily. Max Daily Amount: 30 mg. For [address] [facility] in place:   Recommendation Provided To:    Intervention Detail: New Rx: 1, reason: Patient Preference  Gap Closed?:   Intervention Accepted By:   Time Spent (min): 15

## 2022-09-16 RX ORDER — PHENTERMINE HYDROCHLORIDE 30 MG/1
30 CAPSULE ORAL DAILY
Qty: 30 CAPSULE | Refills: 0 | Status: SHIPPED | OUTPATIENT
Start: 2022-09-18

## 2022-10-02 DIAGNOSIS — Z71.3 WEIGHT LOSS COUNSELING, ENCOUNTER FOR: ICD-10-CM

## 2022-10-03 RX ORDER — TOPIRAMATE 50 MG/1
TABLET, FILM COATED ORAL
Qty: 90 TABLET | Refills: 0 | Status: SHIPPED | OUTPATIENT
Start: 2022-10-03

## 2022-10-12 DIAGNOSIS — E66.01 CLASS 2 SEVERE OBESITY DUE TO EXCESS CALORIES WITH SERIOUS COMORBIDITY AND BODY MASS INDEX (BMI) OF 36.0 TO 36.9 IN ADULT (HCC): ICD-10-CM

## 2022-10-12 DIAGNOSIS — Z71.3 WEIGHT LOSS COUNSELING, ENCOUNTER FOR: ICD-10-CM

## 2022-10-12 RX ORDER — METFORMIN HYDROCHLORIDE 500 MG/1
TABLET ORAL
Qty: 180 TABLET | Refills: 1 | Status: SHIPPED | OUTPATIENT
Start: 2022-10-12

## 2022-11-23 DIAGNOSIS — Z71.3 WEIGHT LOSS COUNSELING, ENCOUNTER FOR: ICD-10-CM

## 2022-11-23 DIAGNOSIS — E66.9 OBESITY (BMI 30.0-34.9): ICD-10-CM

## 2022-11-23 RX ORDER — PHENTERMINE HYDROCHLORIDE 30 MG/1
30 CAPSULE ORAL DAILY
Qty: 30 CAPSULE | Refills: 0 | Status: SHIPPED | OUTPATIENT
Start: 2022-11-23

## 2023-01-03 ENCOUNTER — TRANSCRIBE ORDER (OUTPATIENT)
Dept: SCHEDULING | Age: 48
End: 2023-01-03

## 2023-01-03 DIAGNOSIS — Z12.31 VISIT FOR SCREENING MAMMOGRAM: Primary | ICD-10-CM

## 2023-01-06 ENCOUNTER — HOSPITAL ENCOUNTER (OUTPATIENT)
Dept: MAMMOGRAPHY | Age: 48
Discharge: HOME | End: 2023-01-06
Attending: FAMILY MEDICINE
Payer: COMMERCIAL

## 2023-01-06 DIAGNOSIS — Z71.3 WEIGHT LOSS COUNSELING, ENCOUNTER FOR: ICD-10-CM

## 2023-01-06 DIAGNOSIS — Z12.31 VISIT FOR SCREENING MAMMOGRAM: ICD-10-CM

## 2023-01-06 DIAGNOSIS — E66.9 OBESITY (BMI 30.0-34.9): ICD-10-CM

## 2023-01-06 PROCEDURE — 77067 SCR MAMMO BI INCL CAD: CPT

## 2023-01-06 RX ORDER — PHENTERMINE HYDROCHLORIDE 30 MG/1
30 CAPSULE ORAL DAILY
Qty: 30 CAPSULE | Refills: 0 | Status: SHIPPED | OUTPATIENT
Start: 2023-01-06

## 2023-01-06 NOTE — TELEPHONE ENCOUNTER
As per controlled prescription. Continues to have office or virtual visit every 4 months. She needs an appointment. I will send prescription this time but let her know and schedule for follow-up.

## 2023-01-06 NOTE — TELEPHONE ENCOUNTER
Left generic message on 1/6/23 to callback the office. Per  she gave the pt a (30) day courtesy supply of her Phentermine 30 mg. However  need's the pt to make a CPE appt pt's last CPE was on 1/17/22,when pt calls back please schedule pt accordingly.   -VBN 1/6/23

## 2023-01-18 ENCOUNTER — OFFICE VISIT (OUTPATIENT)
Dept: FAMILY MEDICINE CLINIC | Age: 48
End: 2023-01-18
Payer: COMMERCIAL

## 2023-01-18 VITALS
HEIGHT: 61 IN | HEART RATE: 86 BPM | BODY MASS INDEX: 33.72 KG/M2 | TEMPERATURE: 97.7 F | RESPIRATION RATE: 16 BRPM | SYSTOLIC BLOOD PRESSURE: 112 MMHG | OXYGEN SATURATION: 100 % | DIASTOLIC BLOOD PRESSURE: 73 MMHG | WEIGHT: 178.6 LBS

## 2023-01-18 DIAGNOSIS — Z71.3 WEIGHT LOSS COUNSELING, ENCOUNTER FOR: ICD-10-CM

## 2023-01-18 DIAGNOSIS — E66.9 OBESITY (BMI 30.0-34.9): ICD-10-CM

## 2023-01-18 DIAGNOSIS — Z00.00 ROUTINE GENERAL MEDICAL EXAMINATION AT A HEALTH CARE FACILITY: Primary | ICD-10-CM

## 2023-01-18 PROCEDURE — 99396 PREV VISIT EST AGE 40-64: CPT | Performed by: FAMILY MEDICINE

## 2023-01-18 RX ORDER — DICLOFENAC SODIUM 75 MG/1
75 TABLET, DELAYED RELEASE ORAL 2 TIMES DAILY WITH MEALS
COMMUNITY
Start: 2022-12-20 | End: 2023-01-18 | Stop reason: ALTCHOICE

## 2023-01-18 RX ORDER — PHENTERMINE HYDROCHLORIDE 30 MG/1
30 CAPSULE ORAL DAILY
Qty: 30 CAPSULE | Refills: 0 | Status: SHIPPED | OUTPATIENT
Start: 2023-01-18

## 2023-01-18 RX ORDER — AA/PROT/LYSINE/METHIO/VIT C/B6 50-12.5 MG
TABLET ORAL
COMMUNITY

## 2023-01-18 NOTE — PROGRESS NOTES
Chief Complaint   Patient presents with    Complete Physical     Follow up         1. \"Have you been to the ER, urgent care clinic since your last visit? Hospitalized since your last visit? \" No    2. \"Have you seen or consulted any other health care providers outside of the [address] [place] [address] since your last visit? \" No     3. For patients aged 39-70: Has the patient had a colonoscopy / FIT/ Cologuard? No      If the patient is female:    4. For patients aged 41-77: Has the patient had a mammogram within the past 2 years? Yes - no Care Gap present      5. For patients aged 21-65: Has the patient had a pap smear?  Yes - no Care Gap present       Financial Resource Strain: Not on file      Food Insecurity: Not on file        3 most recent PHQ Screens 1/18/2023   Little interest or pleasure in doing things Not at all   Feeling down, depressed, irritable, or hopeless Not at all   Total Score PHQ 2 0       Health Maintenance Due   Topic Date Due    Colorectal Cancer Screening Combo  Never done    Flu Vaccine (1) 08/01/2022

## 2023-01-18 NOTE — PROGRESS NOTES
HISTORY OF PRESENT ILLNESS  Hany Farrell is a 56 y.o. female. Patient was seen today for annual physical checkup as well as follow-up on weight management. She has her Cologuard completed at GYN office. We have requested records several times from that office without any success. HPI  Health Maintenance  Immunizations:   Influenza: up to date. Tetanus: up to date. Shingles: Not applicable. Pneumonia: n/a. Cancer screening:   Cervical: reviewed guidelines, UTD, done by OBGYN, records requested. Breast: reviewed guidelines, up to date. Colon: reviewed guidelines, completed by OB/GYN, records requested. Obesity  Patient complains of obesity. Patient cites health, increased physical ability as reasons for wanting to lose weight. Obesity History  Weight in late teens: 150 lb. Period of greatest weight gain: 50 lb during early thirtiees  Lowest adult weight: 172  Highest adult weight: 206  Amount of time at present weight: 186  lb for last 2 months     History of Weight Loss Efforts  Greatest amount of weight lost: 15 lb over 4 months  Amount of time that loss was maintained: 3 months  Circumstances associated with regain of weight: stopped exercise and diet plan  Successful weight loss techniques attempted: prescription appetite suppressants:   Unsuccessful weight loss techniques attempted: self-directed dieting, OTC appetite suppressants:      Current Exercise Habits no regular exercise     Current Eating Habits  Number of regular meals per day: 2  Number of snacking episodes per day: 2  Who shops for food? patient  Who prepares food? patient and spouse  Who eats with patient? patient and spouse  Binge behavior?: no  Purge behavior? no  Anorexic behavior? no  Eating precipitated by stress? no  Guilt feelings associated with eating?  yes -      Other Potential Contributing Factors  Use of alcohol: average 0 drinks/week  Use of medications that may cause weight gain none  History of past abuse? none  Psych History: not significant  Comorbidities: sleep apnea/hypopnea, osteoarthritis     She is on Metformin , topiramate and phentermine. She had sleep study done in 10/2020   HSAT demonstrated mild sleep disordered breathing characterized by an overall AHI of 8.5/h associated with minimal SaO2 of 81%. Events more prominent supine with a supine-related AHI of 13.2/h. Snoring during 4.7% of the study. Her [place] sleepiness scale was 14  She followed up with sleep medicine recently and CPAP supply was ordered. Her adjustment will be at 15 cm. Patient Care Team:  Sherman Snowden MD as PCP - General (Family Medicine)  Sherman Snowden MD as PCP - [facility] Empaneled Provider  Other, MD Jami Álvarez MD (Obstetrics & Gynecology)      The following sections were reviewed & updated as appropriate: PMH, PSH, FH, and SH. Review of Systems   Constitutional:  Negative for chills, fever and malaise/fatigue. HENT:  Negative for congestion, ear pain, sore throat and tinnitus. Eyes:  Negative for blurred vision, double vision, pain and discharge. Respiratory:  Negative for cough, shortness of breath and wheezing. Cardiovascular:  Negative for chest pain, palpitations and leg swelling. Gastrointestinal:  Negative for abdominal pain, blood in stool, constipation, diarrhea, nausea and vomiting. Genitourinary:  Negative for dysuria, frequency, hematuria and urgency. Musculoskeletal:  Negative for back pain, joint pain and myalgias. Skin:  Negative for rash. Neurological:  Negative for dizziness, tremors, seizures and headaches. Endo/Heme/Allergies:  Negative for polydipsia. Does not bruise/bleed easily. Psychiatric/Behavioral:  Negative for depression and substance abuse. The patient is not nervous/anxious. Physical Exam  Vitals and nursing note reviewed. Constitutional:       Appearance: She is well-developed. She is not diaphoretic.    HENT:      Head: Normocephalic and atraumatic. Right Ear: External ear normal.      Mouth/Throat:      Mouth: Mucous membranes are moist.      Pharynx: No oropharyngeal exudate. Eyes:      General: No scleral icterus. Conjunctiva/sclera: Conjunctivae normal.      Pupils: Pupils are equal, round, and reactive to light. Neck:      Thyroid: No thyromegaly. Vascular: No JVD. Cardiovascular:      Rate and Rhythm: Normal rate and regular rhythm. Heart sounds: Normal heart sounds. No murmur heard. Pulmonary:      Effort: Pulmonary effort is normal.      Breath sounds: Normal breath sounds. No wheezing. Abdominal:      General: Bowel sounds are normal. There is no distension. Palpations: Abdomen is soft. There is no mass. Musculoskeletal:         General: No tenderness. Normal range of motion. Cervical back: Normal range of motion and neck supple. Lymphadenopathy:      Cervical: No cervical adenopathy. Skin:     General: Skin is warm and dry. Findings: No rash. Neurological:      Mental Status: She is alert and oriented to person, place, and time. Cranial Nerves: No cranial nerve deficit. Deep Tendon Reflexes: Reflexes are normal and symmetric. Reflexes normal.   Psychiatric:         Mood and Affect: Mood normal.         Behavior: Behavior normal.       ASSESSMENT and PLAN  Diagnoses and all orders for this visit:    1. Routine general medical examination at a health care facility  -     CBC WITH AUTOMATED DIFF; Future  -     HEMOGLOBIN A1C WITH EAG; Future  -     LIPID PANEL; Future  -     METABOLIC PANEL, COMPREHENSIVE; Future  -     TSH 3RD GENERATION; Future  -     URINALYSIS W/ RFLX MICROSCOPIC; Future    2. Weight loss counseling, encounter for  -     phentermine 30 mg capsule; Take 1 Capsule by mouth daily. Max Daily Amount: 30 mg.    3. Obesity (BMI 30.0-34.9)  -     phentermine 30 mg capsule; Take 1 Capsule by mouth daily. Max Daily Amount: 30 mg.   Discussed lifestyle issues and health guidance given  Patient was given an after visit summary which includes diagnoses, vital signs, current medications, instructions and references & authorized prescriptions . Results of labs will be conveyed to patient, once available. Pt verbalized instructions I provided and expressed understanding of discussion that was held today. Follow-up and Dispositions    Return in about 4 months (around 5/18/2023) for follow up. Please note that this dictation was completed with [employer], the computer voice recognition software. Quite often unanticipated grammatical, syntax, homophones, and other interpretive errors are inadvertently transcribed by the computer software. Please disregard these errors. Please excuse any errors that have escaped final proofreading. Thank you.

## 2023-01-18 NOTE — LETTER
1/18/2023 11:50 AM        Dear Dr. Valerie Eduardo,    Please fax us the most recent cologuard  so that we may update the patient's records for continuity of care.      Our fax number: [phone]    Patient:   Kassi Hashimoto  1975                      Sincerely,      Sherman Fermin MD

## 2023-01-19 LAB
ALBUMIN SERPL-MCNC: 4.3 G/DL (ref 3.5–5)
ALBUMIN/GLOB SERPL: 1.4 (ref 1.1–2.2)
ALP SERPL-CCNC: 45 U/L (ref 45–117)
ALT SERPL-CCNC: 31 U/L (ref 12–78)
ANION GAP SERPL CALC-SCNC: 2 MMOL/L (ref 5–15)
APPEARANCE UR: CLEAR
AST SERPL-CCNC: 20 U/L (ref 15–37)
BASOPHILS # BLD: 0 K/UL (ref 0–0.1)
BASOPHILS NFR BLD: 0 % (ref 0–1)
BILIRUB SERPL-MCNC: 0.5 MG/DL (ref 0.2–1)
BILIRUB UR QL: NEGATIVE
BUN SERPL-MCNC: 10 MG/DL (ref 6–20)
BUN/CREAT SERPL: 14 (ref 12–20)
CALCIUM SERPL-MCNC: 9.3 MG/DL (ref 8.5–10.1)
CHLORIDE SERPL-SCNC: 108 MMOL/L (ref 97–108)
CHOLEST SERPL-MCNC: 196 MG/DL
CO2 SERPL-SCNC: 28 MMOL/L (ref 21–32)
COLOR UR: NORMAL
CREAT SERPL-MCNC: 0.72 MG/DL (ref 0.55–1.02)
DIFFERENTIAL METHOD BLD: ABNORMAL
EOSINOPHIL # BLD: 0 K/UL (ref 0–0.4)
EOSINOPHIL NFR BLD: 1 % (ref 0–7)
ERYTHROCYTE [DISTWIDTH] IN BLOOD BY AUTOMATED COUNT: 13.7 % (ref 11.5–14.5)
EST. AVERAGE GLUCOSE BLD GHB EST-MCNC: 111 MG/DL
GLOBULIN SER CALC-MCNC: 3.1 G/DL (ref 2–4)
GLUCOSE SERPL-MCNC: 84 MG/DL (ref 65–100)
GLUCOSE UR STRIP.AUTO-MCNC: NEGATIVE MG/DL
HBA1C MFR BLD: 5.5 % (ref 4–5.6)
HCT VFR BLD AUTO: 43.4 % (ref 35–47)
HDLC SERPL-MCNC: 79 MG/DL
HDLC SERPL: 2.5 (ref 0–5)
HGB BLD-MCNC: 12.9 G/DL (ref 11.5–16)
HGB UR QL STRIP: NEGATIVE
IMM GRANULOCYTES # BLD AUTO: 0 K/UL (ref 0–0.04)
IMM GRANULOCYTES NFR BLD AUTO: 0 % (ref 0–0.5)
KETONES UR QL STRIP.AUTO: NEGATIVE MG/DL
LDLC SERPL CALC-MCNC: 106.8 MG/DL (ref 0–100)
LEUKOCYTE ESTERASE UR QL STRIP.AUTO: NEGATIVE
LYMPHOCYTES # BLD: 2 K/UL (ref 0.8–3.5)
LYMPHOCYTES NFR BLD: 43 % (ref 12–49)
MCH RBC QN AUTO: 26.7 PG (ref 26–34)
MCHC RBC AUTO-ENTMCNC: 29.7 G/DL (ref 30–36.5)
MCV RBC AUTO: 89.9 FL (ref 80–99)
MONOCYTES # BLD: 0.4 K/UL (ref 0–1)
MONOCYTES NFR BLD: 9 % (ref 5–13)
NEUTS SEG # BLD: 2.2 K/UL (ref 1.8–8)
NEUTS SEG NFR BLD: 47 % (ref 32–75)
NITRITE UR QL STRIP.AUTO: NEGATIVE
NRBC # BLD: 0 K/UL (ref 0–0.01)
NRBC BLD-RTO: 0 PER 100 WBC
PH UR STRIP: 5.5 (ref 5–8)
PLATELET # BLD AUTO: 241 K/UL (ref 150–400)
PMV BLD AUTO: 12.3 FL (ref 8.9–12.9)
POTASSIUM SERPL-SCNC: 4.6 MMOL/L (ref 3.5–5.1)
PROT SERPL-MCNC: 7.4 G/DL (ref 6.4–8.2)
PROT UR STRIP-MCNC: NEGATIVE MG/DL
RBC # BLD AUTO: 4.83 M/UL (ref 3.8–5.2)
SODIUM SERPL-SCNC: 138 MMOL/L (ref 136–145)
SP GR UR REFRACTOMETRY: 1.01 (ref 1–1.03)
TRIGL SERPL-MCNC: 51 MG/DL (ref ?–150)
TSH SERPL DL<=0.05 MIU/L-ACNC: 1.34 UIU/ML (ref 0.36–3.74)
UROBILINOGEN UR QL STRIP.AUTO: 0.2 EU/DL (ref 0.2–1)
VLDLC SERPL CALC-MCNC: 10.2 MG/DL
WBC # BLD AUTO: 4.7 K/UL (ref 3.6–11)

## 2023-01-21 NOTE — PROGRESS NOTES
Sana Prather,  I have reviewed your results. All results including blood count, kidney and liver function, cholesterol profile, screening for diabetes, thyroid function, urine analysis are very normal.  Your insurance has authorized your phentermine. Please check with pharmacy I have resent your prescription. Let me know if you have any questions, thanks.

## 2023-03-14 DIAGNOSIS — E66.9 OBESITY (BMI 30.0-34.9): ICD-10-CM

## 2023-03-14 DIAGNOSIS — Z71.3 WEIGHT LOSS COUNSELING, ENCOUNTER FOR: ICD-10-CM

## 2023-03-14 RX ORDER — PHENTERMINE HYDROCHLORIDE 30 MG/1
30 CAPSULE ORAL DAILY
Qty: 30 CAPSULE | Refills: 0 | Status: SHIPPED | OUTPATIENT
Start: 2023-03-14

## 2023-03-14 NOTE — TELEPHONE ENCOUNTER
Last Visit: 1/18/23 with MD Alireza Bose  Next Appointment: Idris Allison to follow-up in 4 months (5/18/23)  Previous Refill Encounter(s): 1/18/23 #30    Requested Prescriptions     Pending Prescriptions Disp Refills    phentermine 30 mg capsule 30 Capsule 0     Sig: Take 1 Capsule by mouth daily. Max Daily Amount: 30 mg. For Pharmacy Admin Tracking Only    Program: Medication Refill  CPA in place:   Recommendation Provided To:    Intervention Detail: New Rx: 1, reason: Patient Preference  Intervention Accepted By:   Spencer Alba Closed?:   Time Spent (min): 5

## 2023-03-30 DIAGNOSIS — Z71.3 WEIGHT LOSS COUNSELING, ENCOUNTER FOR: ICD-10-CM

## 2023-03-30 RX ORDER — TOPIRAMATE 50 MG/1
TABLET, FILM COATED ORAL
Qty: 90 TABLET | Refills: 1 | Status: SHIPPED | OUTPATIENT
Start: 2023-03-30

## 2023-03-30 NOTE — TELEPHONE ENCOUNTER
[place] request for refill.   Thanks, Stephanie Eid    Last Visit: 1/18/23 MD Jaret Ma  Next Appointment: None  Previous Refill Encounter(s): 10/3/22 90    Requested Prescriptions     Pending Prescriptions Disp Refills    topiramate (TOPAMAX) 50 mg tablet 90 Tablet 1     Sig: TAKE 1 TABLET BY MOUTH DAILY     For Pharmacy Admin Tracking Only    Program: Medication Refill  Intervention Detail: New Rx: 1, reason: Patient Preference  Time Spent (min): 5

## 2023-05-03 ENCOUNTER — TELEPHONE (OUTPATIENT)
Dept: FAMILY MEDICINE CLINIC | Age: 48
End: 2023-05-03

## 2023-05-03 DIAGNOSIS — E66.9 OBESITY (BMI 30.0-34.9): ICD-10-CM

## 2023-05-03 DIAGNOSIS — Z71.3 WEIGHT LOSS COUNSELING, ENCOUNTER FOR: ICD-10-CM

## 2023-05-03 RX ORDER — PHENTERMINE HYDROCHLORIDE 30 MG/1
30 CAPSULE ORAL DAILY
Qty: 30 CAPSULE | Refills: 0 | Status: SHIPPED | OUTPATIENT
Start: 2023-05-03

## 2023-05-04 RX ORDER — PHENTERMINE HYDROCHLORIDE 30 MG/1
30 CAPSULE ORAL DAILY
OUTPATIENT
Start: 2023-05-04

## 2023-05-08 ENCOUNTER — TELEMEDICINE (OUTPATIENT)
Age: 48
End: 2023-05-08

## 2023-05-08 DIAGNOSIS — Z71.3 WEIGHT LOSS COUNSELING, ENCOUNTER FOR: Primary | ICD-10-CM

## 2023-05-08 DIAGNOSIS — M54.16 LUMBAR RADICULOPATHY: ICD-10-CM

## 2023-05-08 PROCEDURE — 99213 OFFICE O/P EST LOW 20 MIN: CPT | Performed by: FAMILY MEDICINE

## 2023-05-08 ASSESSMENT — ENCOUNTER SYMPTOMS
BACK PAIN: 1
ABDOMINAL PAIN: 0
CHEST TIGHTNESS: 0
SORE THROAT: 0
SHORTNESS OF BREATH: 0
DIARRHEA: 0
CONSTIPATION: 0
COUGH: 0

## 2023-05-08 ASSESSMENT — PATIENT HEALTH QUESTIONNAIRE - PHQ9
SUM OF ALL RESPONSES TO PHQ9 QUESTIONS 1 & 2: 0
1. LITTLE INTEREST OR PLEASURE IN DOING THINGS: 0
SUM OF ALL RESPONSES TO PHQ QUESTIONS 1-9: 0
2. FEELING DOWN, DEPRESSED OR HOPELESS: 0
SUM OF ALL RESPONSES TO PHQ QUESTIONS 1-9: 0

## 2023-05-08 NOTE — PROGRESS NOTES
Gastrointestinal:  Negative for abdominal pain, constipation and diarrhea. Genitourinary:  Negative for dysuria. Musculoskeletal:  Positive for back pain. Negative for neck pain. Skin:  Negative for rash. Neurological:  Negative for dizziness and headaches. Psychiatric/Behavioral:  Negative for behavioral problems. The patient is not nervous/anxious. Prior to Visit Medications    Medication Sig Taking? Authorizing Provider   Calcium Carbonate-Vitamin D 600-3. 125 MG-MCG TABS Take by mouth Yes Ar Automatic Reconciliation   gabapentin (NEURONTIN) 300 MG capsule Take 1 capsule by mouth 2 times daily. Yes Ar Automatic Reconciliation   metFORMIN (GLUCOPHAGE) 500 MG tablet Take 1 tablet by mouth 2 times daily (with meals) Yes Ar Automatic Reconciliation   phentermine 30 MG capsule Take 1 capsule by mouth daily.  Yes Ar Automatic Reconciliation   Specialty Vitamins Products (MG PLUS PROTEIN) 133 MG TABS Take by mouth Yes Ar Automatic Reconciliation   topiramate (TOPAMAX) 50 MG tablet Take 1 tablet by mouth daily Yes Ar Automatic Reconciliation       Social History     Tobacco Use    Smoking status: Never    Smokeless tobacco: Never   Substance Use Topics    Alcohol use: Yes    Drug use: No        No past medical history on file.,   Past Surgical History:   Procedure Laterality Date    APPENDECTOMY      HAMMER TOE SURGERY Bilateral     OTHER SURGICAL HISTORY      appendectomy   ,   Social History     Tobacco Use    Smoking status: Never    Smokeless tobacco: Never   Substance Use Topics    Alcohol use: Yes    Drug use: No   ,   Family History   Problem Relation Age of Onset    Rheum Arthritis Maternal Grandmother     Hypertension Father     Rheum Arthritis Paternal Grandmother     Hypertension Mother     Diabetes Father    ,   Immunization History   Administered Date(s) Administered    COVID-19, MODERNA BLUE border, Primary or Immunocompromised, (age 12y+), IM, 100 mcg/0.5mL 03/26/2021, 04/24/2021, 11/26/2021 yes

## 2023-06-09 DIAGNOSIS — E66.01 MORBID (SEVERE) OBESITY DUE TO EXCESS CALORIES (HCC): ICD-10-CM

## 2023-06-09 DIAGNOSIS — Z71.3 DIETARY COUNSELING AND SURVEILLANCE: ICD-10-CM

## 2023-06-09 NOTE — TELEPHONE ENCOUNTER
Chief Complaint   Patient presents with    Medication Refill     Last Office visit 03/02/2023  Patient does not currently have any appointments scheduled.

## 2023-06-21 DIAGNOSIS — E66.01 MORBID (SEVERE) OBESITY DUE TO EXCESS CALORIES (HCC): Primary | ICD-10-CM

## 2023-06-21 DIAGNOSIS — Z71.3 WEIGHT LOSS COUNSELING, ENCOUNTER FOR: ICD-10-CM

## 2023-06-21 RX ORDER — PHENTERMINE HYDROCHLORIDE 30 MG/1
30 CAPSULE ORAL DAILY
Qty: 30 CAPSULE | Refills: 0 | Status: SHIPPED | OUTPATIENT
Start: 2023-06-21 | End: 2023-07-21

## 2023-06-21 NOTE — TELEPHONE ENCOUNTER
Patient called stating that she is in of a refill of Phentermine, and is hoping to get a call back when this has been sent.     Best call back number  [phone]

## 2023-06-30 DIAGNOSIS — Z71.3 WEIGHT LOSS COUNSELING, ENCOUNTER FOR: ICD-10-CM

## 2023-06-30 DIAGNOSIS — E66.01 MORBID (SEVERE) OBESITY DUE TO EXCESS CALORIES (HCC): ICD-10-CM

## 2023-06-30 RX ORDER — PHENTERMINE HYDROCHLORIDE 30 MG/1
30 CAPSULE ORAL DAILY
Qty: 30 CAPSULE | Refills: 0 | Status: SHIPPED | OUTPATIENT
Start: 2023-06-30 | End: 2023-07-30

## 2023-08-06 DIAGNOSIS — Z71.3 DIETARY COUNSELING AND SURVEILLANCE: ICD-10-CM

## 2023-08-06 DIAGNOSIS — E66.01 MORBID (SEVERE) OBESITY DUE TO EXCESS CALORIES (HCC): ICD-10-CM

## 2023-08-09 ENCOUNTER — TELEPHONE (OUTPATIENT)
Age: 48
End: 2023-08-09

## 2023-08-09 DIAGNOSIS — E66.01 MORBID (SEVERE) OBESITY DUE TO EXCESS CALORIES (HCC): ICD-10-CM

## 2023-08-09 DIAGNOSIS — Z71.3 WEIGHT LOSS COUNSELING, ENCOUNTER FOR: ICD-10-CM

## 2023-08-09 DIAGNOSIS — I83.893 SYMPTOMATIC VARICOSE VEINS, BILATERAL: Primary | ICD-10-CM

## 2023-08-09 RX ORDER — PHENTERMINE HYDROCHLORIDE 30 MG/1
30 CAPSULE ORAL DAILY
Qty: 30 CAPSULE | Refills: 0 | Status: SHIPPED | OUTPATIENT
Start: 2023-08-09 | End: 2023-08-09 | Stop reason: SDUPTHER

## 2023-08-09 RX ORDER — PHENTERMINE HYDROCHLORIDE 30 MG/1
30 CAPSULE ORAL DAILY
Qty: 30 CAPSULE | Refills: 0 | Status: SHIPPED | OUTPATIENT
Start: 2023-08-09 | End: 2023-09-08

## 2023-08-09 NOTE — TELEPHONE ENCOUNTER
----- Message from Nehemiah Kinnek sent at 8/9/2023  8:08 AM EDT -----  Subject: Refill Request    QUESTIONS  Name of Medication? phentermine 30 MG capsule  Patient-reported dosage and instructions? Take 1 capsule by mouth daily   for 30 days. Max Daily Amount? 30 mg  How many days do you have left? 1  Preferred Pharmacy? [id number] Pharmacy phone number (if available)? [phone]  ---------------------------------------------------------------------------  --------------  CALL BACK INFO  What is the best way for the office to contact you? OK to leave message on   voicemail  Preferred Call Back Phone Number? [phone]  ---------------------------------------------------------------------------  --------------  SCRIPT ANSWERS  Relationship to Patient?  Self

## 2023-08-09 NOTE — TELEPHONE ENCOUNTER
----- Message from Shirley Michaud sent at 8/9/2023  8:14 AM EDT -----  Subject: Message to Provider    QUESTIONS  Information for Provider? pt. is leaving to [place] on 8/13/2023 and is   asking for compression stocking that goes over her knees and thighs for   her veracious veins pt. states ins. will cover if it is a prescription pt. is requesting this gets ordered thru ? [address]  [facility] [address] [place] [address],[address] , [address] [phone] please ,call pt. to discuss type   requested and needed   ---------------------------------------------------------------------------  --------------  [facility]  [phone]; OK to leave message on voicemail  ---------------------------------------------------------------------------  --------------  SCRIPT ANSWERS  Relationship to Patient?  Self

## 2023-08-09 NOTE — TELEPHONE ENCOUNTER
----- Message from Allison Keane sent at 8/9/2023  8:14 AM EDT -----  Subject: Message to Provider    QUESTIONS  Information for Provider? pt. is leaving to [place] on 8/13/2023 and is   asking for compression stocking that goes over her knees and thighs for   her veracious veins pt. states ins. will cover if it is a prescription pt. is requesting this gets ordered thru ? [address]  [facility] [address] [place] [address],[address] , [address] [phone] please ,call pt. to discuss type   requested and needed   ---------------------------------------------------------------------------  --------------  Talia Ortiz INFO  [phone]; OK to leave message on voicemail  ---------------------------------------------------------------------------  --------------  SCRIPT ANSWERS  Relationship to Patient?  Self

## 2023-08-09 NOTE — ADDENDUM NOTE
Addended by: Rai Marquis on: 8/9/2023 01:36 PM     Modules accepted: Orders Post-Procedure Care: Apply cicalfate cream/aloe twice daily for the next 3 days.

## 2023-08-21 ENCOUNTER — TELEMEDICINE (OUTPATIENT)
Age: 48
End: 2023-08-21
Payer: COMMERCIAL

## 2023-08-21 DIAGNOSIS — I83.893 SYMPTOMATIC VARICOSE VEINS, BILATERAL: Primary | ICD-10-CM

## 2023-08-21 PROCEDURE — 99213 OFFICE O/P EST LOW 20 MIN: CPT | Performed by: FAMILY MEDICINE

## 2023-08-21 ASSESSMENT — ENCOUNTER SYMPTOMS
SORE THROAT: 0
SHORTNESS OF BREATH: 0
COUGH: 0
BACK PAIN: 0
ABDOMINAL PAIN: 0
DIARRHEA: 0
CONSTIPATION: 0
CHEST TIGHTNESS: 0

## 2023-08-21 ASSESSMENT — PATIENT HEALTH QUESTIONNAIRE - PHQ9
2. FEELING DOWN, DEPRESSED OR HOPELESS: 0
SUM OF ALL RESPONSES TO PHQ QUESTIONS 1-9: 0
SUM OF ALL RESPONSES TO PHQ QUESTIONS 1-9: 0
1. LITTLE INTEREST OR PLEASURE IN DOING THINGS: 0
SUM OF ALL RESPONSES TO PHQ QUESTIONS 1-9: 0
SUM OF ALL RESPONSES TO PHQ9 QUESTIONS 1 & 2: 0
SUM OF ALL RESPONSES TO PHQ QUESTIONS 1-9: 0

## 2023-08-21 NOTE — PROGRESS NOTES
(TOPAMAX) 50 MG tablet Take 1 tablet by mouth daily Yes Ar Automatic Reconciliation       Social History     Tobacco Use    Smoking status: Never    Smokeless tobacco: Never   Substance Use Topics    Alcohol use: Yes    Drug use: No        Allergies   Allergen Reactions    Quinine Itching   , No past medical history on file.,   Past Surgical History:   Procedure Laterality Date    APPENDECTOMY      HAMMER TOE SURGERY Bilateral     OTHER SURGICAL HISTORY      appendectomy   ,   Social History     Tobacco Use    Smoking status: Never    Smokeless tobacco: Never   Substance Use Topics    Alcohol use: Yes    Drug use: No   ,   Family History   Problem Relation Age of Onset    Rheum Arthritis Maternal Grandmother     Hypertension Father     Rheum Arthritis Paternal Grandmother     Hypertension Mother     Diabetes Father    ,   Immunization History   Administered Date(s) Administered    COVID-19, MODERNA BLUE border, Primary or Immunocompromised, (age 12y+), IM, 100 mcg/0.5mL 03/26/2021, 04/24/2021, 11/26/2021    COVID-19, MODERNA Bivalent, (age 12y+), IM, 48 mcg/0.5 mL 11/18/2022    Influenza Virus Vaccine 11/05/2012, 10/07/2016, 10/21/2017, 10/01/2018, 10/24/2019, 09/28/2020, 10/15/2021    Influenza, FLUARIX, FLULAVAL, FLUZONE (age 10 mo+) AND AFLURIA, (age 1 y+), PF, 0.5mL 10/24/2019, 09/28/2020    TDaP, ADACEL (age 6y-58y), BOOSTRIX (age 10y+), IM, 0.5mL 09/01/2011, 08/16/2022           PHYSICAL EXAMINATION:  [ INSTRUCTIONS:  \"[x]\" Indicates a positive item  \"[]\" Indicates a negative item  -- DELETE ALL ITEMS NOT EXAMINED]  Vital Signs: (As obtained by patient/caregiver or practitioner observation)        Constitutional: [x] Appears well-developed and well-nourished [x] No apparent distress      [] Abnormal -     Mental status: [x] Alert and awake  [x] Oriented to person/place/time [x] Able to follow commands    [] Abnormal -     Eyes:   EOM    [x]  Normal    [] Abnormal -   Sclera  [x]  Normal    [] Abnormal -

## 2023-09-14 DIAGNOSIS — E66.01 MORBID (SEVERE) OBESITY DUE TO EXCESS CALORIES (HCC): Primary | ICD-10-CM

## 2023-09-14 DIAGNOSIS — Z71.3 WEIGHT LOSS COUNSELING, ENCOUNTER FOR: ICD-10-CM

## 2023-09-14 RX ORDER — TOPIRAMATE 50 MG/1
50 TABLET, FILM COATED ORAL DAILY
Qty: 90 TABLET | Refills: 0 | Status: SHIPPED | OUTPATIENT
Start: 2023-09-14 | End: 2023-12-13

## 2023-09-14 RX ORDER — PHENTERMINE HYDROCHLORIDE 30 MG/1
30 CAPSULE ORAL EVERY MORNING
Qty: 30 CAPSULE | Refills: 0 | Status: SHIPPED | OUTPATIENT
Start: 2023-09-14 | End: 2023-10-14

## 2023-09-14 RX ORDER — PHENTERMINE HYDROCHLORIDE 30 MG/1
30 CAPSULE ORAL EVERY MORNING
COMMUNITY
End: 2023-09-14 | Stop reason: SDUPTHER

## 2023-10-03 ENCOUNTER — TELEPHONE (OUTPATIENT)
Age: 48
End: 2023-10-03

## 2023-10-03 DIAGNOSIS — Z71.3 WEIGHT LOSS COUNSELING, ENCOUNTER FOR: ICD-10-CM

## 2023-10-03 DIAGNOSIS — E66.01 MORBID (SEVERE) OBESITY DUE TO EXCESS CALORIES (HCC): Primary | ICD-10-CM

## 2023-10-03 RX ORDER — PHENTERMINE HYDROCHLORIDE 37.5 MG/1
37.5 TABLET ORAL
Qty: 30 TABLET | Refills: 0 | Status: SHIPPED | OUTPATIENT
Start: 2023-10-03 | End: 2023-11-02

## 2023-10-03 NOTE — TELEPHONE ENCOUNTER
Spoke with pt. Was calling about refill request for phentermine 30 MG capsule. Pt says the 30 MG is not in stock nowhere, and wants a call back to see if medication prescription can be updated to phentermine 37 MG. Best call back number [phone].

## 2023-11-13 DIAGNOSIS — Z71.3 WEIGHT LOSS COUNSELING, ENCOUNTER FOR: ICD-10-CM

## 2023-11-13 DIAGNOSIS — E66.01 MORBID (SEVERE) OBESITY DUE TO EXCESS CALORIES (HCC): ICD-10-CM

## 2023-11-13 RX ORDER — PHENTERMINE HYDROCHLORIDE 37.5 MG/1
37.5 TABLET ORAL
Qty: 30 TABLET | Refills: 0 | Status: SHIPPED | OUTPATIENT
Start: 2023-11-13 | End: 2023-12-27 | Stop reason: SDUPTHER

## 2023-11-13 NOTE — TELEPHONE ENCOUNTER
Please let patient know that she is due for follow-up office visit as per protocol for phentermine prescription refills.  Patient was last seen in office in January of this year.  For phentermine, we need to have at least every 6 months visit in office for weight check as well as cardiovascular evaluation.  Please let her know and schedule.  This will be the last refill.   Statement Selected

## 2023-11-13 NOTE — TELEPHONE ENCOUNTER
[facility] office on 11.13.23 when pt calls back please inform her that  gave her a (30) day supply of her Phentermine 37.5 mg.However this is her last refill  need's to see her for an In Office Appt as soon as possible.

## 2023-12-13 DIAGNOSIS — Z71.3 WEIGHT LOSS COUNSELING, ENCOUNTER FOR: ICD-10-CM

## 2023-12-13 DIAGNOSIS — E66.01 MORBID (SEVERE) OBESITY DUE TO EXCESS CALORIES (HCC): ICD-10-CM

## 2023-12-13 RX ORDER — TOPIRAMATE 50 MG/1
50 TABLET, FILM COATED ORAL DAILY
Qty: 90 TABLET | Refills: 0 | Status: SHIPPED | OUTPATIENT
Start: 2023-12-13 | End: 2024-02-15 | Stop reason: DRUGHIGH

## 2023-12-13 NOTE — TELEPHONE ENCOUNTER
PCP: Yeni Lyons MD      No future appointments.    Requested Prescriptions     Pending Prescriptions Disp Refills    topiramate (TOPAMAX) 50 MG tablet [Pharmacy Med Name: TOPIRAMATE 50 MG TABLET] 90 tablet 0     Sig: TAKE 1 TABLET BY MOUTH EVERY DAY      Last seen at 01/18/2023

## 2023-12-27 DIAGNOSIS — Z71.3 WEIGHT LOSS COUNSELING, ENCOUNTER FOR: ICD-10-CM

## 2023-12-27 DIAGNOSIS — E66.01 MORBID (SEVERE) OBESITY DUE TO EXCESS CALORIES (HCC): ICD-10-CM

## 2023-12-27 RX ORDER — PHENTERMINE HYDROCHLORIDE 37.5 MG/1
37.5 TABLET ORAL
Qty: 30 TABLET | Refills: 0 | Status: SHIPPED | OUTPATIENT
Start: 2023-12-27 | End: 2024-01-26

## 2023-12-27 NOTE — TELEPHONE ENCOUNTER
----- Message from Marisol Hinds sent at 12/27/2023 12:40 PM EST -----  Subject: Refill Request    QUESTIONS  Name of Medication? phentermine (ADIPEX-P) 37.5 MG tablet  Patient-reported dosage and instructions? 1 daily  How many days do you have left? 0  [facility]? [id number]  Pharmacy phone number (if available)? [phone]  ---------------------------------------------------------------------------  --------------  Ruma Marker INFO  What is the best way for the office to contact you? OK to leave message on   voicemail  Preferred Call Back Phone Number? [phone]  ---------------------------------------------------------------------------  --------------  SCRIPT ANSWERS  Relationship to Patient?  Self

## 2024-02-08 DIAGNOSIS — E66.01 MORBID (SEVERE) OBESITY DUE TO EXCESS CALORIES (HCC): ICD-10-CM

## 2024-02-08 DIAGNOSIS — Z71.3 WEIGHT LOSS COUNSELING, ENCOUNTER FOR: ICD-10-CM

## 2024-02-08 RX ORDER — PHENTERMINE HYDROCHLORIDE 37.5 MG/1
37.5 TABLET ORAL
Qty: 30 TABLET | Refills: 0 | OUTPATIENT
Start: 2024-02-08 | End: 2024-03-09

## 2024-02-08 NOTE — TELEPHONE ENCOUNTER
PCP: Yeni Lyons MD      No future appointments.    Requested Prescriptions     Pending Prescriptions Disp Refills    phentermine (ADIPEX-P) 37.5 MG tablet 30 tablet 0     Sig: Take 1 tablet by mouth every morning (before breakfast) for 30 days. Max Daily Amount: 37.5 mg     LVV: 8/21/23

## 2024-02-08 NOTE — TELEPHONE ENCOUNTER
Patient was recommended to make an appointment as she did not have appointment since August.  Due to medication she is on, she needs to have either virtual or office visit every 3 to 4 months.  Her last office visit was in last January.  Cannot do further medication refill and patient has in office examination, vitals checked.  Patient will need to be seen in office.  Please let her know and schedule, thanks.

## 2024-02-08 NOTE — TELEPHONE ENCOUNTER
Attempted to call patient. Lvm to return call back. Pt can be seen in office on Monday at 8:20 am. There is an open slot. Please call patient and confirm if pt can come on Monday at 8:20am      Attempted to call pt again. Pt' name and  verified Scheduled for next available office visit on 2/15/24 at 2:20pm

## 2024-02-15 ENCOUNTER — OFFICE VISIT (OUTPATIENT)
Age: 49
End: 2024-02-15
Payer: COMMERCIAL

## 2024-02-15 VITALS
BODY MASS INDEX: 33.23 KG/M2 | TEMPERATURE: 97.8 F | SYSTOLIC BLOOD PRESSURE: 114 MMHG | HEIGHT: 61 IN | HEART RATE: 95 BPM | RESPIRATION RATE: 14 BRPM | OXYGEN SATURATION: 100 % | WEIGHT: 176 LBS | DIASTOLIC BLOOD PRESSURE: 77 MMHG

## 2024-02-15 DIAGNOSIS — Z71.3 DIETARY COUNSELING AND SURVEILLANCE: ICD-10-CM

## 2024-02-15 DIAGNOSIS — E66.9 OBESITY (BMI 30.0-34.9): ICD-10-CM

## 2024-02-15 DIAGNOSIS — Z71.3 WEIGHT LOSS COUNSELING, ENCOUNTER FOR: ICD-10-CM

## 2024-02-15 DIAGNOSIS — Z00.00 ENCOUNTER FOR GENERAL ADULT MEDICAL EXAMINATION WITHOUT ABNORMAL FINDINGS: Primary | ICD-10-CM

## 2024-02-15 PROCEDURE — 99396 PREV VISIT EST AGE 40-64: CPT | Performed by: FAMILY MEDICINE

## 2024-02-15 RX ORDER — TOPIRAMATE 100 MG/1
100 TABLET, FILM COATED ORAL DAILY
Qty: 90 TABLET | Refills: 1 | Status: SHIPPED | OUTPATIENT
Start: 2024-02-15

## 2024-02-15 RX ORDER — PHENTERMINE HYDROCHLORIDE 37.5 MG/1
37.5 TABLET ORAL
Qty: 30 TABLET | Refills: 0 | Status: SHIPPED | OUTPATIENT
Start: 2024-02-15 | End: 2024-03-16

## 2024-02-15 NOTE — PROGRESS NOTES
Chief Complaint   Patient presents with    Medication Refill     Follow up     \"Have you been to the ER, urgent care clinic since your last visit?  Hospitalized since your last visit?\"    NO    “Have you seen or consulted any other health care providers outside of [facility] since your last visit?”    NO                   2/15/2024     1:54 PM   PHQ-9    Little interest or pleasure in doing things 0   Feeling down, depressed, or hopeless 0   PHQ-2 Score 0   PHQ-9 Total Score 0           Financial Resource Strain: Low Risk  (2/15/2024)    Overall Financial Resource Strain (CARDIA)     Difficulty of Paying Living Expenses: Not hard at all      Food Insecurity: No Food Insecurity (2/15/2024)    Hunger Vital Sign     Worried About Running Out of Food in the Last Year: Never true     Ran Out of Food in the Last Year: Never true          Health Maintenance Due   Topic Date Due    Hepatitis B vaccine (1 of 3 - 3-dose series) Never done    Flu vaccine (1) 08/01/2023    COVID-19 Vaccine (5 - 2023-24 season) 09/01/2023        
verbalized instructions I provided and expressed understanding of discussion that was held today.    Please note that this dictation was completed with [employer], the computer voice recognition software.  Quite often unanticipated grammatical, syntax, homophones, and other interpretive errors are inadvertently transcribed by the computer software.  Please disregard these errors.  Please excuse any errors that have escaped final proofreading.  Thank you.     No follow-ups on file.     Electronically signed by Yeni Lyons MD on 2/15/24 at 4:43 PM EST

## 2024-02-16 ENCOUNTER — NURSE ONLY (OUTPATIENT)
Age: 49
End: 2024-02-16

## 2024-02-16 DIAGNOSIS — Z00.00 ENCOUNTER FOR GENERAL ADULT MEDICAL EXAMINATION WITHOUT ABNORMAL FINDINGS: ICD-10-CM

## 2024-02-16 LAB
ALBUMIN SERPL-MCNC: 4.2 G/DL (ref 3.5–5)
ALBUMIN/GLOB SERPL: 1.3 (ref 1.1–2.2)
ALP SERPL-CCNC: 51 U/L (ref 45–117)
ALT SERPL-CCNC: 30 U/L (ref 12–78)
ANION GAP SERPL CALC-SCNC: 4 MMOL/L (ref 5–15)
APPEARANCE UR: CLEAR
AST SERPL-CCNC: 20 U/L (ref 15–37)
BACTERIA URNS QL MICRO: NEGATIVE /HPF
BASOPHILS # BLD: 0.1 K/UL (ref 0–0.1)
BASOPHILS NFR BLD: 1 % (ref 0–1)
BILIRUB SERPL-MCNC: 0.7 MG/DL (ref 0.2–1)
BILIRUB UR QL: NEGATIVE
BUN SERPL-MCNC: 13 MG/DL (ref 6–20)
BUN/CREAT SERPL: 17 (ref 12–20)
CALCIUM SERPL-MCNC: 9.3 MG/DL (ref 8.5–10.1)
CHLORIDE SERPL-SCNC: 108 MMOL/L (ref 97–108)
CHOLEST SERPL-MCNC: 203 MG/DL
CO2 SERPL-SCNC: 27 MMOL/L (ref 21–32)
COLOR UR: NORMAL
CREAT SERPL-MCNC: 0.76 MG/DL (ref 0.55–1.02)
DIFFERENTIAL METHOD BLD: NORMAL
EOSINOPHIL # BLD: 0.1 K/UL (ref 0–0.4)
EOSINOPHIL NFR BLD: 1 % (ref 0–7)
EPITH CASTS URNS QL MICRO: NORMAL /LPF
ERYTHROCYTE [DISTWIDTH] IN BLOOD BY AUTOMATED COUNT: 13.6 % (ref 11.5–14.5)
EST. AVERAGE GLUCOSE BLD GHB EST-MCNC: 100 MG/DL
GLOBULIN SER CALC-MCNC: 3.2 G/DL (ref 2–4)
GLUCOSE SERPL-MCNC: 89 MG/DL (ref 65–100)
GLUCOSE UR STRIP.AUTO-MCNC: NEGATIVE MG/DL
HBA1C MFR BLD: 5.1 % (ref 4–5.6)
HCT VFR BLD AUTO: 42.3 % (ref 35–47)
HDLC SERPL-MCNC: 93 MG/DL
HDLC SERPL: 2.2 (ref 0–5)
HGB BLD-MCNC: 13.2 G/DL (ref 11.5–16)
HGB UR QL STRIP: NEGATIVE
HYALINE CASTS URNS QL MICRO: NORMAL /LPF (ref 0–5)
IMM GRANULOCYTES # BLD AUTO: 0 K/UL (ref 0–0.04)
IMM GRANULOCYTES NFR BLD AUTO: 0 % (ref 0–0.5)
KETONES UR QL STRIP.AUTO: NEGATIVE MG/DL
LDLC SERPL CALC-MCNC: 103.2 MG/DL (ref 0–100)
LEUKOCYTE ESTERASE UR QL STRIP.AUTO: NEGATIVE
LYMPHOCYTES # BLD: 2.2 K/UL (ref 0.8–3.5)
LYMPHOCYTES NFR BLD: 46 % (ref 12–49)
MCH RBC QN AUTO: 27 PG (ref 26–34)
MCHC RBC AUTO-ENTMCNC: 31.2 G/DL (ref 30–36.5)
MCV RBC AUTO: 86.7 FL (ref 80–99)
MONOCYTES # BLD: 0.4 K/UL (ref 0–1)
MONOCYTES NFR BLD: 8 % (ref 5–13)
NEUTS SEG # BLD: 2.2 K/UL (ref 1.8–8)
NEUTS SEG NFR BLD: 44 % (ref 32–75)
NITRITE UR QL STRIP.AUTO: NEGATIVE
NRBC # BLD: 0 K/UL (ref 0–0.01)
NRBC BLD-RTO: 0 PER 100 WBC
PH UR STRIP: 6.5 (ref 5–8)
PLATELET # BLD AUTO: 233 K/UL (ref 150–400)
PMV BLD AUTO: 11.9 FL (ref 8.9–12.9)
POTASSIUM SERPL-SCNC: 4.2 MMOL/L (ref 3.5–5.1)
PROT SERPL-MCNC: 7.4 G/DL (ref 6.4–8.2)
PROT UR STRIP-MCNC: NEGATIVE MG/DL
RBC # BLD AUTO: 4.88 M/UL (ref 3.8–5.2)
RBC #/AREA URNS HPF: NORMAL /HPF (ref 0–5)
SODIUM SERPL-SCNC: 139 MMOL/L (ref 136–145)
SP GR UR REFRACTOMETRY: 1.01 (ref 1–1.03)
TRIGL SERPL-MCNC: 34 MG/DL
TSH SERPL DL<=0.05 MIU/L-ACNC: 1.23 UIU/ML (ref 0.36–3.74)
UROBILINOGEN UR QL STRIP.AUTO: 0.2 EU/DL (ref 0.2–1)
VLDLC SERPL CALC-MCNC: 6.8 MG/DL
WBC # BLD AUTO: 4.9 K/UL (ref 3.6–11)
WBC URNS QL MICRO: NORMAL /HPF (ref 0–4)

## 2024-03-13 ENCOUNTER — OFFICE VISIT (OUTPATIENT)
Age: 49
End: 2024-03-13
Payer: COMMERCIAL

## 2024-03-13 VITALS
HEART RATE: 87 BPM | BODY MASS INDEX: 33.23 KG/M2 | RESPIRATION RATE: 16 BRPM | OXYGEN SATURATION: 100 % | WEIGHT: 176 LBS | TEMPERATURE: 97.3 F | DIASTOLIC BLOOD PRESSURE: 74 MMHG | HEIGHT: 61 IN | SYSTOLIC BLOOD PRESSURE: 115 MMHG

## 2024-03-13 DIAGNOSIS — S29.012A STRAIN OF RHOMBOID MUSCLE, INITIAL ENCOUNTER: ICD-10-CM

## 2024-03-13 DIAGNOSIS — M62.838 CERVICAL PARASPINAL MUSCLE SPASM: ICD-10-CM

## 2024-03-13 DIAGNOSIS — M62.838 TRAPEZIUS MUSCLE SPASM: Primary | ICD-10-CM

## 2024-03-13 PROCEDURE — 99213 OFFICE O/P EST LOW 20 MIN: CPT | Performed by: FAMILY MEDICINE

## 2024-03-13 RX ORDER — ETODOLAC 400 MG/1
400 TABLET, FILM COATED ORAL 2 TIMES DAILY
Qty: 20 TABLET | Refills: 0 | Status: SHIPPED | OUTPATIENT
Start: 2024-03-13 | End: 2024-03-23

## 2024-03-13 RX ORDER — METHOCARBAMOL 750 MG/1
750 TABLET, FILM COATED ORAL 3 TIMES DAILY
Qty: 30 TABLET | Refills: 0 | Status: SHIPPED | OUTPATIENT
Start: 2024-03-13 | End: 2024-03-23

## 2024-03-13 ASSESSMENT — ENCOUNTER SYMPTOMS
SORE THROAT: 0
ABDOMINAL PAIN: 0
COUGH: 0
BACK PAIN: 1
CHEST TIGHTNESS: 0
SHORTNESS OF BREATH: 0
CONSTIPATION: 0
DIARRHEA: 0

## 2024-03-13 NOTE — PROGRESS NOTES
Date:3/13/2024        Patient Name:Jacqueline Ewing     YOB: 1975     Age:49 y.o.    Seen today for   Chief Complaint   Patient presents with    Shoulder Pain     Right shoulder pain  since saturday     Patient was seen today in acute care for acute right-sided neck pain and right upper back pain that started 3 days ago.  HPI   Neck Pain:  Patient complains of a 3 day(s) history of right neck pain.  Pain is throbbing in nature, with radiation to right neck base., with radiation to right upper back., and with radiation to right scapula.. Pain is constant, typically severe in intensity, and is exacerbated by flexion, extension, turning right.  Associated symptoms: none. She denies: weakness, paresthesia, headache, unexplained weight loss, new bowel or bladder dysfunction, Lhermitte's phenomenon, chest pain, and shortness of breath. 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 Patient's history includes no prior neck problems.  Previous treatment: heat, acetaminophen-extra strength.  Diagnostic testing:  none.  Symptoms show no change over time.      Review of Systems   Review of Systems   Constitutional:  Negative for fever.   HENT:  Negative for congestion, ear pain and sore throat.    Respiratory:  Negative for cough, chest tightness and shortness of breath.    Cardiovascular:  Negative for chest pain and palpitations.   Gastrointestinal:  Negative for abdominal pain, constipation and diarrhea.   Genitourinary:  Negative for dysuria.   Musculoskeletal:  Positive for back pain and neck pain.   Skin:  Negative for rash.   Neurological:  Negative for dizziness and headaches.   Psychiatric/Behavioral:  Negative for behavioral problems. The patient is not nervous/anxious.        Medications     Current Outpatient Medications   Medication Sig Dispense Refill    etodolac (LODINE) 400 MG tablet Take 1 tablet by mouth 2

## 2024-03-25 DIAGNOSIS — E66.01 MORBID (SEVERE) OBESITY DUE TO EXCESS CALORIES (HCC): ICD-10-CM

## 2024-03-25 DIAGNOSIS — Z71.3 DIETARY COUNSELING AND SURVEILLANCE: ICD-10-CM

## 2024-03-26 DIAGNOSIS — Z71.3 WEIGHT LOSS COUNSELING, ENCOUNTER FOR: ICD-10-CM

## 2024-03-26 DIAGNOSIS — Z71.3 DIETARY COUNSELING AND SURVEILLANCE: ICD-10-CM

## 2024-03-26 DIAGNOSIS — E66.9 OBESITY (BMI 30.0-34.9): ICD-10-CM

## 2024-03-26 RX ORDER — PHENTERMINE HYDROCHLORIDE 37.5 MG/1
37.5 TABLET ORAL
Qty: 30 TABLET | Refills: 0 | Status: SHIPPED | OUTPATIENT
Start: 2024-03-26 | End: 2024-04-25

## 2024-03-26 NOTE — TELEPHONE ENCOUNTER
----- Message from Helena Gore MA sent at 3/26/2024  9:02 AM EDT -----  Subject: Refill Request    QUESTIONS  Name of Medication? phentermine (ADIPEX-P) 37.5 MG tablet  Patient-reported dosage and instructions? 37.5 mg 1 tablet daily   How many days do you have left? 0  [id number]  Pharmacy phone number (if available)? [phone]  ---------------------------------------------------------------------------  --------------  CALL BACK INFO  What is the best way for the office to contact you? OK to leave message on   voicemail  Preferred Call Back Phone Number? [phone]  ---------------------------------------------------------------------------  --------------  SCRIPT ANSWERS  Relationship to Patient? Self

## 2024-05-15 DIAGNOSIS — Z71.3 WEIGHT LOSS COUNSELING, ENCOUNTER FOR: ICD-10-CM

## 2024-05-15 DIAGNOSIS — Z71.3 DIETARY COUNSELING AND SURVEILLANCE: ICD-10-CM

## 2024-05-15 DIAGNOSIS — E66.9 OBESITY (BMI 30.0-34.9): ICD-10-CM

## 2024-05-15 RX ORDER — PHENTERMINE HYDROCHLORIDE 37.5 MG/1
37.5 TABLET ORAL
Qty: 30 TABLET | Refills: 0 | Status: SHIPPED | OUTPATIENT
Start: 2024-05-15 | End: 2024-06-14

## 2024-05-15 NOTE — TELEPHONE ENCOUNTER
----- Message from Alberto Mckinney sent at 5/15/2024  8:34 AM EDT -----  Subject: Refill Request    QUESTIONS  Name of Medication? phentermine (ADIPEX-P) 37.5 MG tablet  Patient-reported dosage and instructions? 37.5  How many days do you have left? 0  [facility]? [id number]  Pharmacy phone number (if available)? [phone]  ---------------------------------------------------------------------------  --------------  CALL BACK INFO  What is the best way for the office to contact you? OK to leave message on   voicemail  Preferred Call Back Phone Number? [phone]  ---------------------------------------------------------------------------  --------------  SCRIPT ANSWERS  Relationship to Patient? Self

## 2024-06-26 DIAGNOSIS — Z71.3 DIETARY COUNSELING AND SURVEILLANCE: ICD-10-CM

## 2024-06-26 DIAGNOSIS — Z71.3 WEIGHT LOSS COUNSELING, ENCOUNTER FOR: ICD-10-CM

## 2024-06-26 DIAGNOSIS — E66.9 OBESITY (BMI 30.0-34.9): ICD-10-CM

## 2024-06-26 RX ORDER — PHENTERMINE HYDROCHLORIDE 37.5 MG/1
37.5 TABLET ORAL
Qty: 30 TABLET | Refills: 0 | Status: SHIPPED | OUTPATIENT
Start: 2024-06-26 | End: 2024-07-26

## 2024-06-26 NOTE — TELEPHONE ENCOUNTER
Patient is requesting a refill on phentermine 37.5MG.  She says the best number to reach her is the mobile on file.

## 2024-06-30 DIAGNOSIS — E66.01 MORBID (SEVERE) OBESITY DUE TO EXCESS CALORIES (HCC): ICD-10-CM

## 2024-06-30 DIAGNOSIS — Z71.3 DIETARY COUNSELING AND SURVEILLANCE: ICD-10-CM

## 2024-07-01 NOTE — TELEPHONE ENCOUNTER
PCP: Yeni Lyons MD    Last appt: 3/13/2024   No future appointments.    Requested Prescriptions     Pending Prescriptions Disp Refills    metFORMIN (GLUCOPHAGE) 500 MG tablet [Pharmacy Med Name: METFORMIN TAB 500MG] 180 tablet 0     Sig: TAKE 1 TABLET TWICE DAILY  WITH MEALS         Prior labs and Blood pressures:  BP Readings from Last 3 Encounters:   03/13/24 115/74   02/15/24 114/77   01/18/23 112/73     Lab Results   Component Value Date/Time     02/16/2024 09:53 AM    K 4.2 02/16/2024 09:53 AM     02/16/2024 09:53 AM    CO2 27 02/16/2024 09:53 AM    BUN 13 02/16/2024 09:53 AM    GFRAA >60 05/10/2022 12:19 PM     No results found for: \"HBA1C\", \"[id number]\"  Lab Results   Component Value Date/Time    CHOL 203 02/16/2024 09:53 AM    HDL 93 02/16/2024 09:53 AM    .2 02/16/2024 09:53 AM    VLDL 6.8 02/16/2024 09:53 AM     No results found for: \"VITD3\"    Lab Results   Component Value Date/Time    TSH 1.34 01/18/2023 12:12 PM

## 2024-08-05 NOTE — TELEPHONE ENCOUNTER
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 If she is working at [facility], they have a form, that needs to be completed. I will try to send a prescription to [facility] .   Prescription for phentermine was resent to [facility]. DISPLAY PLAN FREE TEXT

## 2024-08-07 DIAGNOSIS — E66.9 OBESITY (BMI 30.0-34.9): ICD-10-CM

## 2024-08-07 DIAGNOSIS — Z71.3 DIETARY COUNSELING AND SURVEILLANCE: ICD-10-CM

## 2024-08-07 DIAGNOSIS — Z71.3 WEIGHT LOSS COUNSELING, ENCOUNTER FOR: ICD-10-CM

## 2024-08-07 RX ORDER — TOPIRAMATE 100 MG/1
100 TABLET, FILM COATED ORAL DAILY
Qty: 90 TABLET | Refills: 1 | Status: SHIPPED | OUTPATIENT
Start: 2024-08-07

## 2024-08-07 NOTE — TELEPHONE ENCOUNTER
PCP: Yeni Lyons MD    Last appt: 3/13/2024   No future appointments.    Requested Prescriptions     Pending Prescriptions Disp Refills    topiramate (TOPAMAX) 100 MG tablet [Pharmacy Med Name: TOPIRAMATE 100 MG TABLET] 90 tablet 1     Sig: TAKE 1 TABLET BY MOUTH EVERY DAY

## 2024-08-13 DIAGNOSIS — Z71.3 DIETARY COUNSELING AND SURVEILLANCE: ICD-10-CM

## 2024-08-13 DIAGNOSIS — Z71.3 WEIGHT LOSS COUNSELING, ENCOUNTER FOR: ICD-10-CM

## 2024-08-13 DIAGNOSIS — E66.9 OBESITY (BMI 30.0-34.9): ICD-10-CM

## 2024-08-13 RX ORDER — PHENTERMINE HYDROCHLORIDE 37.5 MG/1
37.5 TABLET ORAL
Qty: 30 TABLET | Refills: 0 | Status: SHIPPED | OUTPATIENT
Start: 2024-08-13 | End: 2024-09-12

## 2024-08-13 NOTE — TELEPHONE ENCOUNTER
Please let her know that she will be due for her appointment before next refill required for controlled prescription.  This will be her office visit.  Please schedule.  I have sent prescription to pharmacy.  Thanks

## 2024-08-13 NOTE — TELEPHONE ENCOUNTER
Patient called stating that she is hoping to get a refill on her Phentermine medication. Patient is hoping to have this refilled as soon as possible, as she is almost out of this medication.    Best call back number  [phone]

## 2024-09-07 DIAGNOSIS — Z71.3 DIETARY COUNSELING AND SURVEILLANCE: ICD-10-CM

## 2024-09-07 DIAGNOSIS — E66.01 MORBID (SEVERE) OBESITY DUE TO EXCESS CALORIES (HCC): ICD-10-CM

## 2024-09-19 ENCOUNTER — OFFICE VISIT (OUTPATIENT)
Age: 49
End: 2024-09-19

## 2024-09-19 VITALS
TEMPERATURE: 97.5 F | OXYGEN SATURATION: 99 % | DIASTOLIC BLOOD PRESSURE: 82 MMHG | BODY MASS INDEX: 33.46 KG/M2 | RESPIRATION RATE: 16 BRPM | SYSTOLIC BLOOD PRESSURE: 118 MMHG | WEIGHT: 177.2 LBS | HEIGHT: 61 IN | HEART RATE: 87 BPM

## 2024-09-19 DIAGNOSIS — Z51.89 ENCOUNTER FOR MONITORING OF PATIENT COMPLIANCE IN DRUG TREATMENT PROGRAM: ICD-10-CM

## 2024-09-19 DIAGNOSIS — E66.9 OBESITY (BMI 30.0-34.9): ICD-10-CM

## 2024-09-19 DIAGNOSIS — Z71.3 WEIGHT LOSS COUNSELING, ENCOUNTER FOR: Primary | ICD-10-CM

## 2024-09-19 DIAGNOSIS — Z71.3 DIETARY COUNSELING AND SURVEILLANCE: ICD-10-CM

## 2024-09-19 ASSESSMENT — ENCOUNTER SYMPTOMS
SORE THROAT: 0
ABDOMINAL PAIN: 0
COUGH: 0
BACK PAIN: 0
SHORTNESS OF BREATH: 0
CONSTIPATION: 0
CHEST TIGHTNESS: 0
DIARRHEA: 0

## 2024-09-26 DIAGNOSIS — Z71.3 WEIGHT LOSS COUNSELING, ENCOUNTER FOR: ICD-10-CM

## 2024-09-26 DIAGNOSIS — Z71.3 DIETARY COUNSELING AND SURVEILLANCE: ICD-10-CM

## 2024-09-26 DIAGNOSIS — E66.9 OBESITY (BMI 30.0-34.9): ICD-10-CM

## 2024-09-26 LAB — DRUGS UR: NORMAL

## 2024-09-26 RX ORDER — PHENTERMINE HYDROCHLORIDE 37.5 MG/1
37.5 TABLET ORAL
Qty: 30 TABLET | Refills: 0 | Status: SHIPPED | OUTPATIENT
Start: 2024-09-26 | End: 2024-10-26

## 2024-10-27 DIAGNOSIS — E66.01 MORBID (SEVERE) OBESITY DUE TO EXCESS CALORIES: ICD-10-CM

## 2024-10-27 DIAGNOSIS — Z71.3 DIETARY COUNSELING AND SURVEILLANCE: ICD-10-CM

## 2024-11-07 DIAGNOSIS — E66.811 OBESITY (BMI 30.0-34.9): ICD-10-CM

## 2024-11-07 DIAGNOSIS — Z71.3 DIETARY COUNSELING AND SURVEILLANCE: ICD-10-CM

## 2024-11-07 DIAGNOSIS — Z71.3 WEIGHT LOSS COUNSELING, ENCOUNTER FOR: ICD-10-CM

## 2024-11-07 RX ORDER — PHENTERMINE HYDROCHLORIDE 37.5 MG/1
37.5 TABLET ORAL
Qty: 30 TABLET | Refills: 0 | Status: SHIPPED | OUTPATIENT
Start: 2024-11-07 | End: 2024-12-07

## 2024-12-24 DIAGNOSIS — Z71.3 WEIGHT LOSS COUNSELING, ENCOUNTER FOR: Primary | ICD-10-CM

## 2024-12-27 RX ORDER — PHENTERMINE HYDROCHLORIDE 37.5 MG/1
37.5 TABLET ORAL
Qty: 30 TABLET | Refills: 0 | Status: SHIPPED | OUTPATIENT
Start: 2024-12-27 | End: 2025-01-26

## 2024-12-27 NOTE — TELEPHONE ENCOUNTER
Patient called requesting refill (adipex-P) 37.5 mg tablet    Re-Entered as last prescribed if appropriate.  Check .  Nick Abby    Last appointment: 9/19/24 Serena  Next appointment: 3/19/25 Serena  Previous refill encounter(s): 11/7/24 30    Requested Prescriptions     Pending Prescriptions Disp Refills    phentermine (ADIPEX-P) 37.5 MG tablet 30 tablet 0     Sig: Take 1 tablet by mouth every morning (before breakfast) for 30 days. Max Daily Amount: 37.5 mg     For Pharmacy Admin Tracking Only    Program: Medication Refill  CPA in place:    Recommendation Provided To:   Intervention Detail: New Rx: 1, reason: Patient Preference  Intervention Accepted By:   Gap Closed?:    Time Spent (min): 5

## 2024-12-30 ENCOUNTER — TELEPHONE (OUTPATIENT)
Age: 49
End: 2024-12-30

## 2024-12-30 DIAGNOSIS — Z71.3 WEIGHT LOSS COUNSELING, ENCOUNTER FOR: ICD-10-CM

## 2025-01-01 RX ORDER — PHENTERMINE HYDROCHLORIDE 37.5 MG/1
37.5 TABLET ORAL
Qty: 30 TABLET | Refills: 0 | Status: SHIPPED | OUTPATIENT
Start: 2025-01-01 | End: 2025-01-31

## 2025-01-18 DIAGNOSIS — Z71.3 DIETARY COUNSELING AND SURVEILLANCE: ICD-10-CM

## 2025-01-18 DIAGNOSIS — E66.01 MORBID (SEVERE) OBESITY DUE TO EXCESS CALORIES: ICD-10-CM

## 2025-02-02 DIAGNOSIS — E66.811 OBESITY (BMI 30.0-34.9): ICD-10-CM

## 2025-02-02 DIAGNOSIS — Z71.3 WEIGHT LOSS COUNSELING, ENCOUNTER FOR: ICD-10-CM

## 2025-02-02 DIAGNOSIS — Z71.3 DIETARY COUNSELING AND SURVEILLANCE: ICD-10-CM

## 2025-02-02 RX ORDER — TOPIRAMATE 100 MG/1
100 TABLET, FILM COATED ORAL DAILY
Qty: 90 TABLET | Refills: 1 | Status: SHIPPED | OUTPATIENT
Start: 2025-02-02

## 2025-02-06 ENCOUNTER — OFFICE VISIT (OUTPATIENT)
Age: 50
End: 2025-02-06
Payer: COMMERCIAL

## 2025-02-06 VITALS
OXYGEN SATURATION: 99 % | HEART RATE: 81 BPM | HEIGHT: 61 IN | SYSTOLIC BLOOD PRESSURE: 121 MMHG | RESPIRATION RATE: 16 BRPM | BODY MASS INDEX: 33.79 KG/M2 | DIASTOLIC BLOOD PRESSURE: 79 MMHG | TEMPERATURE: 96.9 F | WEIGHT: 179 LBS

## 2025-02-06 DIAGNOSIS — Z12.11 COLON CANCER SCREENING: ICD-10-CM

## 2025-02-06 DIAGNOSIS — B37.2 CANDIDAL INTERTRIGO: ICD-10-CM

## 2025-02-06 DIAGNOSIS — Z71.3 WEIGHT LOSS COUNSELING, ENCOUNTER FOR: ICD-10-CM

## 2025-02-06 DIAGNOSIS — B37.2 INTERTRIGO OF GENITOCRURAL REGION DUE TO CANDIDA SPECIES: Primary | ICD-10-CM

## 2025-02-06 PROCEDURE — 99213 OFFICE O/P EST LOW 20 MIN: CPT | Performed by: FAMILY MEDICINE

## 2025-02-06 RX ORDER — FLUCONAZOLE 100 MG/1
100 TABLET ORAL DAILY
Qty: 7 TABLET | Refills: 0 | Status: SHIPPED | OUTPATIENT
Start: 2025-02-06 | End: 2025-02-13

## 2025-02-06 RX ORDER — NYSTATIN 100000 [USP'U]/G
POWDER TOPICAL
Qty: 60 G | Refills: 1 | Status: SHIPPED | OUTPATIENT
Start: 2025-02-06

## 2025-02-06 RX ORDER — PHENTERMINE HYDROCHLORIDE 37.5 MG/1
37.5 TABLET ORAL
Qty: 30 TABLET | Refills: 0 | Status: SHIPPED | OUTPATIENT
Start: 2025-02-06 | End: 2025-03-08

## 2025-02-06 RX ORDER — CLOTRIMAZOLE AND BETAMETHASONE DIPROPIONATE 10; .64 MG/G; MG/G
CREAM TOPICAL
Qty: 45 G | Refills: 1 | Status: SHIPPED | OUTPATIENT
Start: 2025-02-06

## 2025-02-06 SDOH — ECONOMIC STABILITY: FOOD INSECURITY: WITHIN THE PAST 12 MONTHS, YOU WORRIED THAT YOUR FOOD WOULD RUN OUT BEFORE YOU GOT MONEY TO BUY MORE.: NEVER TRUE

## 2025-02-06 SDOH — ECONOMIC STABILITY: FOOD INSECURITY: WITHIN THE PAST 12 MONTHS, THE FOOD YOU BOUGHT JUST DIDN'T LAST AND YOU DIDN'T HAVE MONEY TO GET MORE.: NEVER TRUE

## 2025-02-06 ASSESSMENT — ENCOUNTER SYMPTOMS
DIARRHEA: 0
ABDOMINAL PAIN: 0
SHORTNESS OF BREATH: 0
CHEST TIGHTNESS: 0
COUGH: 0
BACK PAIN: 0
SORE THROAT: 0
CONSTIPATION: 0

## 2025-02-06 ASSESSMENT — PATIENT HEALTH QUESTIONNAIRE - PHQ9
SUM OF ALL RESPONSES TO PHQ QUESTIONS 1-9: 0
SUM OF ALL RESPONSES TO PHQ9 QUESTIONS 1 & 2: 0
SUM OF ALL RESPONSES TO PHQ QUESTIONS 1-9: 0
SUM OF ALL RESPONSES TO PHQ QUESTIONS 1-9: 0
1. LITTLE INTEREST OR PLEASURE IN DOING THINGS: NOT AT ALL
2. FEELING DOWN, DEPRESSED OR HOPELESS: NOT AT ALL
SUM OF ALL RESPONSES TO PHQ QUESTIONS 1-9: 0

## 2025-02-06 NOTE — PROGRESS NOTES
Chief Complaint   Patient presents with    Rash     Under the breat x 10 days and in groin area. Itchy, black spots. Spreading          \"Have you been to the ER, urgent care clinic since your last visit?  Hospitalized since your last visit?\"    NO    “Have you seen or consulted any other health care providers outside of [facility] since your last visit?”    NO    Have you had a mammogram?”   NO    Date of last Mammogram: 1/6/2023      “Have you had a pap smear?”    NO    Date of last Cervical Cancer screen (HPV or PAP): 11/3/2021         “Have you had a colorectal cancer screening such as a colonoscopy/FIT/Cologuard?    NO    No colonoscopy on file  Date of last Cologuard: 12/6/2021  No FIT/FOBT on file   No flexible sigmoidoscopy on file         Click Here for Release of Records Request           2/6/2025    11:05 AM   PHQ-9    Little interest or pleasure in doing things 0   Feeling down, depressed, or hopeless 0   PHQ-2 Score 0   PHQ-9 Total Score 0           Financial Resource Strain: Low Risk  (2/15/2024)    Overall Financial Resource Strain (CARDIA)     Difficulty of Paying Living Expenses: Not hard at all      Food Insecurity: No Food Insecurity (2/6/2025)    Hunger Vital Sign     Worried About Running Out of Food in the Last Year: Never true     Ran Out of Food in the Last Year: Never true          Health Maintenance Due   Topic Date Due    Flu vaccine (1) 08/01/2024    COVID-19 Vaccine (5 - 2024-25 season) 09/01/2024    Cervical cancer screen  11/03/2024    Colorectal Cancer Screen  12/06/2024    Breast cancer screen  01/06/2025    Depression Screen  02/15/2025        
follow-ups on file.     Electronically signed by Yeni Lyons MD on 2/6/25 at 5:22 PM EST

## 2025-02-22 ENCOUNTER — HOSPITAL ENCOUNTER (OUTPATIENT)
Facility: HOSPITAL | Age: 50
Discharge: HOME | End: 2025-02-25
Attending: PODIATRIST
Payer: COMMERCIAL

## 2025-02-22 DIAGNOSIS — R22.41 KNEE MASS, RIGHT: ICD-10-CM

## 2025-02-22 DIAGNOSIS — M67.471 GANGLION, RIGHT ANKLE AND FOOT: ICD-10-CM

## 2025-02-22 PROCEDURE — 6360000004 HC RX CONTRAST MEDICATION: Performed by: PODIATRIST

## 2025-02-22 PROCEDURE — 73720 MRI LWR EXTREMITY W/O&W/DYE: CPT

## 2025-02-22 PROCEDURE — A9579 GAD-BASE MR CONTRAST NOS,1ML: HCPCS | Performed by: PODIATRIST

## 2025-02-22 RX ADMIN — GADOTERIDOL 16 ML: 279.3 INJECTION, SOLUTION INTRAVENOUS at 12:01

## 2025-02-27 ENCOUNTER — TRANSCRIBE ORDERS (OUTPATIENT)
Facility: HOSPITAL | Age: 50
End: 2025-02-27

## 2025-02-27 DIAGNOSIS — Z12.31 VISIT FOR SCREENING MAMMOGRAM: Primary | ICD-10-CM

## 2025-03-05 ENCOUNTER — HOSPITAL ENCOUNTER (OUTPATIENT)
Facility: HOSPITAL | Age: 50
Discharge: HOME | End: 2025-03-08
Attending: FAMILY MEDICINE
Payer: COMMERCIAL

## 2025-03-05 VITALS — BODY MASS INDEX: 33.79 KG/M2 | HEIGHT: 61 IN | WEIGHT: 179 LBS

## 2025-03-05 DIAGNOSIS — Z12.31 VISIT FOR SCREENING MAMMOGRAM: ICD-10-CM

## 2025-03-05 PROCEDURE — 77063 BREAST TOMOSYNTHESIS BI: CPT

## 2025-03-19 ENCOUNTER — OFFICE VISIT (OUTPATIENT)
Age: 50
End: 2025-03-19
Payer: COMMERCIAL

## 2025-03-19 ENCOUNTER — RESULTS FOLLOW-UP (OUTPATIENT)
Age: 50
End: 2025-03-19

## 2025-03-19 VITALS
HEART RATE: 77 BPM | TEMPERATURE: 97.5 F | HEIGHT: 61 IN | DIASTOLIC BLOOD PRESSURE: 77 MMHG | SYSTOLIC BLOOD PRESSURE: 125 MMHG | WEIGHT: 182 LBS | BODY MASS INDEX: 34.36 KG/M2 | RESPIRATION RATE: 16 BRPM | OXYGEN SATURATION: 99 %

## 2025-03-19 DIAGNOSIS — E66.811 OBESITY (BMI 30.0-34.9): ICD-10-CM

## 2025-03-19 DIAGNOSIS — Z00.00 ENCOUNTER FOR PREVENTATIVE ADULT HEALTH CARE EXAMINATION: Primary | ICD-10-CM

## 2025-03-19 DIAGNOSIS — Z71.84 TRAVEL ADVICE ENCOUNTER: ICD-10-CM

## 2025-03-19 DIAGNOSIS — Z71.3 WEIGHT LOSS COUNSELING, ENCOUNTER FOR: ICD-10-CM

## 2025-03-19 LAB
ALBUMIN SERPL-MCNC: 4.3 G/DL (ref 3.5–5)
ALBUMIN/GLOB SERPL: 1.2 (ref 1.1–2.2)
ALP SERPL-CCNC: 73 U/L (ref 45–117)
ALT SERPL-CCNC: 32 U/L (ref 12–78)
ANION GAP SERPL CALC-SCNC: 4 MMOL/L (ref 2–12)
APPEARANCE UR: CLEAR
AST SERPL-CCNC: 30 U/L (ref 15–37)
BACTERIA URNS QL MICRO: NEGATIVE /HPF
BASOPHILS # BLD: 0.04 K/UL (ref 0–0.1)
BASOPHILS NFR BLD: 0.7 % (ref 0–1)
BILIRUB SERPL-MCNC: 0.6 MG/DL (ref 0.2–1)
BILIRUB UR QL: NEGATIVE
BUN SERPL-MCNC: 11 MG/DL (ref 6–20)
BUN/CREAT SERPL: 15 (ref 12–20)
CALCIUM SERPL-MCNC: 9.7 MG/DL (ref 8.5–10.1)
CHLORIDE SERPL-SCNC: 106 MMOL/L (ref 97–108)
CHOLEST SERPL-MCNC: 235 MG/DL
CO2 SERPL-SCNC: 26 MMOL/L (ref 21–32)
COLOR UR: NORMAL
CREAT SERPL-MCNC: 0.73 MG/DL (ref 0.55–1.02)
DIFFERENTIAL METHOD BLD: NORMAL
EOSINOPHIL # BLD: 0.05 K/UL (ref 0–0.4)
EOSINOPHIL NFR BLD: 0.9 % (ref 0–7)
EPITH CASTS URNS QL MICRO: NORMAL /LPF
ERYTHROCYTE [DISTWIDTH] IN BLOOD BY AUTOMATED COUNT: 14.3 % (ref 11.5–14.5)
EST. AVERAGE GLUCOSE BLD GHB EST-MCNC: 108 MG/DL
GLOBULIN SER CALC-MCNC: 3.5 G/DL (ref 2–4)
GLUCOSE SERPL-MCNC: 78 MG/DL (ref 65–100)
GLUCOSE UR STRIP.AUTO-MCNC: NEGATIVE MG/DL
HBA1C MFR BLD: 5.4 % (ref 4–5.6)
HCT VFR BLD AUTO: 43.2 % (ref 35–47)
HDLC SERPL-MCNC: 99 MG/DL
HDLC SERPL: 2.4 (ref 0–5)
HGB BLD-MCNC: 13.6 G/DL (ref 11.5–16)
HGB UR QL STRIP: NEGATIVE
HYALINE CASTS URNS QL MICRO: NORMAL /LPF (ref 0–5)
IMM GRANULOCYTES # BLD AUTO: 0.01 K/UL (ref 0–0.04)
IMM GRANULOCYTES NFR BLD AUTO: 0.2 % (ref 0–0.5)
KETONES UR QL STRIP.AUTO: NEGATIVE MG/DL
LDLC SERPL CALC-MCNC: 126 MG/DL (ref 0–100)
LEUKOCYTE ESTERASE UR QL STRIP.AUTO: NEGATIVE
LYMPHOCYTES # BLD: 2.63 K/UL (ref 0.8–3.5)
LYMPHOCYTES NFR BLD: 45.9 % (ref 12–49)
MCH RBC QN AUTO: 27 PG (ref 26–34)
MCHC RBC AUTO-ENTMCNC: 31.5 G/DL (ref 30–36.5)
MCV RBC AUTO: 85.9 FL (ref 80–99)
MONOCYTES # BLD: 0.37 K/UL (ref 0–1)
MONOCYTES NFR BLD: 6.5 % (ref 5–13)
NEUTS SEG # BLD: 2.63 K/UL (ref 1.8–8)
NEUTS SEG NFR BLD: 45.8 % (ref 32–75)
NITRITE UR QL STRIP.AUTO: NEGATIVE
NRBC # BLD: 0 K/UL (ref 0–0.01)
NRBC BLD-RTO: 0 PER 100 WBC
PH UR STRIP: 7.5 (ref 5–8)
PLATELET # BLD AUTO: 244 K/UL (ref 150–400)
PMV BLD AUTO: 12.1 FL (ref 8.9–12.9)
POTASSIUM SERPL-SCNC: 3.4 MMOL/L (ref 3.5–5.1)
PROT SERPL-MCNC: 7.8 G/DL (ref 6.4–8.2)
PROT UR STRIP-MCNC: NEGATIVE MG/DL
RBC # BLD AUTO: 5.03 M/UL (ref 3.8–5.2)
RBC #/AREA URNS HPF: NORMAL /HPF (ref 0–5)
SODIUM SERPL-SCNC: 136 MMOL/L (ref 136–145)
SP GR UR REFRACTOMETRY: 1.01 (ref 1–1.03)
TRIGL SERPL-MCNC: 50 MG/DL
TSH SERPL DL<=0.05 MIU/L-ACNC: 2.31 UIU/ML (ref 0.36–3.74)
UROBILINOGEN UR QL STRIP.AUTO: 0.2 EU/DL (ref 0.2–1)
VLDLC SERPL CALC-MCNC: 10 MG/DL
WBC # BLD AUTO: 5.7 K/UL (ref 3.6–11)
WBC URNS QL MICRO: NORMAL /HPF (ref 0–4)

## 2025-03-19 PROCEDURE — 99213 OFFICE O/P EST LOW 20 MIN: CPT | Performed by: FAMILY MEDICINE

## 2025-03-19 PROCEDURE — 99396 PREV VISIT EST AGE 40-64: CPT | Performed by: FAMILY MEDICINE

## 2025-03-19 RX ORDER — POLYETHYLENE GLYCOL-3350 AND ELECTROLYTES 236; 6.74; 5.86; 2.97; 22.74 G/274.31G; G/274.31G; G/274.31G; G/274.31G; G/274.31G
POWDER, FOR SOLUTION ORAL
COMMUNITY
Start: 2025-02-07

## 2025-03-19 RX ORDER — MEFLOQUINE HYDROCHLORIDE 250 MG/1
250 TABLET ORAL
Qty: 5 TABLET | Refills: 0 | Status: SHIPPED | OUTPATIENT
Start: 2025-03-19 | End: 2025-04-17

## 2025-03-19 RX ORDER — AZITHROMYCIN 500 MG/1
500 TABLET, FILM COATED ORAL DAILY
Qty: 3 TABLET | Refills: 0 | Status: SHIPPED | OUTPATIENT
Start: 2025-03-19 | End: 2025-03-22

## 2025-03-19 ASSESSMENT — PATIENT HEALTH QUESTIONNAIRE - PHQ9
SUM OF ALL RESPONSES TO PHQ QUESTIONS 1-9: 0
1. LITTLE INTEREST OR PLEASURE IN DOING THINGS: NOT AT ALL
SUM OF ALL RESPONSES TO PHQ QUESTIONS 1-9: 0
2. FEELING DOWN, DEPRESSED OR HOPELESS: NOT AT ALL
SUM OF ALL RESPONSES TO PHQ QUESTIONS 1-9: 0
SUM OF ALL RESPONSES TO PHQ QUESTIONS 1-9: 0

## 2025-03-19 ASSESSMENT — ENCOUNTER SYMPTOMS
CHEST TIGHTNESS: 0
ABDOMINAL PAIN: 0
CONSTIPATION: 0
DIARRHEA: 0
BACK PAIN: 0
SHORTNESS OF BREATH: 0
SORE THROAT: 0
COUGH: 0

## 2025-03-19 NOTE — PROGRESS NOTES
Chief Complaint   Patient presents with    Annual Exam     Follow up         \"Have you been to the ER, urgent care clinic since your last visit?  Hospitalized since your last visit?\"    NO    “Have you seen or consulted any other health care providers outside of [facility] since your last visit?”    NO        “Have you had a colorectal cancer screening such as a colonoscopy/FIT/Cologuard?    NO    No colonoscopy on file  Date of last Cologuard: 12/6/2021  No FIT/FOBT on file   No flexible sigmoidoscopy on file         Click Here for Release of Records Request           3/19/2025     9:37 AM   PHQ-9    Little interest or pleasure in doing things 0   Feeling down, depressed, or hopeless 0   PHQ-2 Score 0   PHQ-9 Total Score 0           Financial Resource Strain: Low Risk  (2/15/2024)    Overall Financial Resource Strain (CARDIA)     Difficulty of Paying Living Expenses: Not hard at all      Food Insecurity: No Food Insecurity (2/6/2025)    Hunger Vital Sign     Worried About Running Out of Food in the Last Year: Never true     Ran Out of Food in the Last Year: Never true          Health Maintenance Due   Topic Date Due    COVID-19 Vaccine (5 - 2024-25 season) 09/01/2024    Colorectal Cancer Screen  12/06/2024        
completed her mammogram and Pap smear, and a colonoscopy is scheduled for 05/2025. She is due for a tetanus booster in 2032. A prescription for mefloquine will be provided, with instructions to take one tablet weekly, starting one week prior to her travel and continuing for three weeks during her stay in [facility], and one tablet upon her return. A total of 5 tablets will be provided. A prescription for traveler's diarrhea medication will also be provided, to be taken only in the event of severe diarrhea accompanied by fever or blood in the stool. A prescription for Saxenda will be provided, with instructions to administer 0.6 mg for the first month, followed by 1.2 mg for the subsequent month, and then 0.8 mg thereafter. She has been informed of potential side effects, including nausea, vomiting, and abdominal pain. She will discontinue phentermine if she starts the Saxenda injections.    Follow-up  The patient will follow up in 3 months if she initiates the Saxenda injections, otherwise, a 6-month follow-up will be scheduled.      Discussed lifestyle issues and health guidance given  Patient was given an after visit summary which includes diagnoses, vital signs, current medications, instructions and references & authorized prescriptions . Results of labs will be conveyed to patient, once available.  Pt verbalized instructions I provided and expressed understanding of discussion that was held today.    Please note that this dictation was completed with [email or web address], the computer voice recognition software.  Quite often unanticipated grammatical, syntax, homophones, and other interpretive errors are inadvertently transcribed by the computer software.  Please disregard these errors.  Please excuse any errors that have escaped final proofreading.  Thank you.     Return in about 6 months (around 9/19/2025) for follow up.     Electronically signed by Yeni Lyons MD on 3/19/25 at 11:17 AM EDT

## 2025-03-19 NOTE — ASSESSMENT & PLAN NOTE
Chronic, not at goal (unstable), changes made today: Added semaglutide injection as there is no improvement with phentermine, metformin and topiramate, medication adherence emphasized, and lifestyle modifications recommended

## 2025-03-19 NOTE — RESULT ENCOUNTER NOTE
Singh Phillips,  I have reviewed your results.  Cholesterol results are showing elevated total cholesterol but your triglycerides, good cholesterol and bad cholesterol are excellent.  You do not need to be on medicine, just focus on diet and limit fried food.  Results for kidney and liver function, urine analysis, thyroid function, A1c for diabetes screening, blood count all are very normal.  Let me know if any questions, thanks.

## 2025-03-24 DIAGNOSIS — Z71.3 WEIGHT LOSS COUNSELING, ENCOUNTER FOR: Primary | ICD-10-CM

## 2025-03-24 DIAGNOSIS — E66.811 OBESITY (BMI 30.0-34.9): ICD-10-CM

## 2025-03-24 RX ORDER — PEN NEEDLE, DIABETIC 30 GX3/16"
NEEDLE, DISPOSABLE MISCELLANEOUS
Qty: 100 EACH | Refills: 0 | Status: SHIPPED | OUTPATIENT
Start: 2025-03-24

## 2025-04-13 DIAGNOSIS — Z71.3 DIETARY COUNSELING AND SURVEILLANCE: ICD-10-CM

## 2025-04-13 DIAGNOSIS — E66.01 MORBID (SEVERE) OBESITY DUE TO EXCESS CALORIES: ICD-10-CM

## 2025-04-14 DIAGNOSIS — Z71.3 WEIGHT LOSS COUNSELING, ENCOUNTER FOR: ICD-10-CM

## 2025-04-14 DIAGNOSIS — E66.811 OBESITY (BMI 30.0-34.9): ICD-10-CM

## 2025-04-14 RX ORDER — LIRAGLUTIDE 6 MG/ML
1.2 INJECTION SUBCUTANEOUS DAILY
Qty: 2 ADJUSTABLE DOSE PRE-FILLED PEN SYRINGE | Refills: 0 | Status: SHIPPED | OUTPATIENT
Start: 2025-04-14

## 2025-04-14 NOTE — TELEPHONE ENCOUNTER
PCP: Yeni Lyons MD    Last appt: 3/19/2025     No future appointments.    Requested Prescriptions     Pending Prescriptions Disp Refills    Liraglutide (VICTOZA) 18 MG/3ML SOPN SC injection [Pharmacy Med Name: LIRAGLUTIDE 2-YESSENIA 18 MG/3 ML]       Sig: INJECT 0.6 MG UNDER THE SKIN ONCE DAILY         Prior labs and Blood pressures:  BP Readings from Last 3 Encounters:   03/19/25 125/77   02/06/25 121/79   09/19/24 118/82     Lab Results   Component Value Date/Time     03/19/2025 10:34 AM    K 3.4 03/19/2025 10:34 AM     03/19/2025 10:34 AM    CO2 26 03/19/2025 10:34 AM    BUN 11 03/19/2025 10:34 AM    GFRAA >60 05/10/2022 12:19 PM     Lab Results   Component Value Date/Time    CHOL 235 03/19/2025 10:34 AM    HDL 99 03/19/2025 10:34 AM     03/19/2025 10:34 AM    .2 02/16/2024 09:53 AM    VLDL 10 03/19/2025 10:34 AM     Lab Results   Component Value Date/Time    TSH 2.31 03/19/2025 10:34 AM

## 2025-04-14 NOTE — TELEPHONE ENCOUNTER
PCP: Yeni Lyons MD    Last appt: 3/19/2025     No future appointments.    Requested Prescriptions     Pending Prescriptions Disp Refills    metFORMIN (GLUCOPHAGE) 500 MG tablet 180 tablet 0     Sig: Take 1 tablet by mouth 2 times daily (with meals)       Prior labs and Blood pressures:  BP Readings from Last 3 Encounters:   03/19/25 125/77   02/06/25 121/79   09/19/24 118/82     Lab Results   Component Value Date/Time     03/19/2025 10:34 AM    K 3.4 03/19/2025 10:34 AM     03/19/2025 10:34 AM    CO2 26 03/19/2025 10:34 AM    BUN 11 03/19/2025 10:34 AM    GFRAA >60 05/10/2022 12:19 PM     No results found for: \"HBA1C\", \"[id number]\"  Lab Results   Component Value Date/Time    CHOL 235 03/19/2025 10:34 AM    HDL 99 03/19/2025 10:34 AM     03/19/2025 10:34 AM    .2 02/16/2024 09:53 AM    VLDL 10 03/19/2025 10:34 AM     No results found for: \"VITD3\"    Lab Results   Component Value Date/Time    TSH 2.31 03/19/2025 10:34 AM

## 2025-05-07 ENCOUNTER — TELEPHONE (OUTPATIENT)
Age: 50
End: 2025-05-07

## 2025-05-07 DIAGNOSIS — Z71.84 TRAVEL ADVICE ENCOUNTER: ICD-10-CM

## 2025-05-07 RX ORDER — ATOVAQUONE AND PROGUANIL HYDROCHLORIDE 250; 100 MG/1; MG/1
1 TABLET, FILM COATED ORAL DAILY
Qty: 30 TABLET | Refills: 0 | Status: SHIPPED | OUTPATIENT
Start: 2025-05-07 | End: 2025-06-06

## 2025-05-07 NOTE — TELEPHONE ENCOUNTER
Called patient. Two patient identifiers verified.Informed Rx sent to the pharmacy.  To take 2-3 days before travel and to take one tablets during stay there

## 2025-05-07 NOTE — TELEPHONE ENCOUNTER
Please let her know that mefloquine has shortage overall places.  I have changed her malaria precaution medication to Malarone that is more safer and more covered.  She will take 1 tablet daily during her stay there.  To start 2 to 3 days before her travel  Thanks

## 2025-05-07 NOTE — TELEPHONE ENCOUNTER
Outbound call to patient. Name and  verified. Malaria medication is not available. Pt is requesting mefloquine to sent to [facility] on [address]

## 2025-05-07 NOTE — TELEPHONE ENCOUNTER
----- Message from Sigifredo MACKEY sent at 5/6/2025  2:47 PM EDT -----  Regarding: ECC Message to Provider  ECC Message to Provider    Relationship to Patient: Self     Additional Information The patient's medication is not available and she wants to have another alternative medication   --------------------------------------------------------------------------------------------------------------------------    Call Back Information: OK to leave message on voicemail  Preferred Call Back Number: Phone [phone]

## 2025-05-13 DIAGNOSIS — Z71.3 WEIGHT LOSS COUNSELING, ENCOUNTER FOR: ICD-10-CM

## 2025-05-13 DIAGNOSIS — E66.811 OBESITY (BMI 30.0-34.9): ICD-10-CM

## 2025-05-13 RX ORDER — LIRAGLUTIDE 6 MG/ML
INJECTION SUBCUTANEOUS
Qty: 6 ML | Refills: 1 | Status: SHIPPED | OUTPATIENT
Start: 2025-05-13

## 2025-05-13 NOTE — TELEPHONE ENCOUNTER
PCP: Yeni Lyons MD    Last appt: 3/19/2025   No future appointments.    Requested Prescriptions     Pending Prescriptions Disp Refills    Liraglutide (VICTOZA) 18 MG/3ML SOPN SC injection [Pharmacy Med Name: LIRAGLUTIDE 2-YESSENIA 18 MG/3 ML]       Sig: INJECT 1.2 MG UNDER THE SKIN ONCE DAILY

## 2025-05-24 DIAGNOSIS — E66.811 OBESITY (BMI 30.0-34.9): ICD-10-CM

## 2025-05-24 DIAGNOSIS — Z71.3 WEIGHT LOSS COUNSELING, ENCOUNTER FOR: ICD-10-CM

## 2025-05-26 RX ORDER — PEN NEEDLE, DIABETIC 32GX 5/32"
NEEDLE, DISPOSABLE MISCELLANEOUS
Qty: 100 EACH | Refills: 0 | Status: SHIPPED | OUTPATIENT
Start: 2025-05-26

## 2025-05-28 ENCOUNTER — TELEPHONE (OUTPATIENT)
Age: 50
End: 2025-05-28

## 2025-05-28 DIAGNOSIS — Z71.84 TRAVEL ADVICE ENCOUNTER: Primary | ICD-10-CM

## 2025-05-28 NOTE — TELEPHONE ENCOUNTER
Outbound call to patient. Name and  verified. Pt is requesting a prescription of typhoid vaccination to be sent to [facility]

## 2025-06-10 DIAGNOSIS — E66.01 MORBID (SEVERE) OBESITY DUE TO EXCESS CALORIES (HCC): ICD-10-CM

## 2025-06-10 DIAGNOSIS — Z71.3 DIETARY COUNSELING AND SURVEILLANCE: ICD-10-CM

## 2025-06-10 NOTE — TELEPHONE ENCOUNTER
Pt called states that she is needing a refill on medication Metformin 500 mg. [place] but, is out of medication and is asking for a refill.      BCBN [phone]

## 2025-06-30 DIAGNOSIS — Z71.3 DIETARY COUNSELING AND SURVEILLANCE: ICD-10-CM

## 2025-06-30 DIAGNOSIS — E66.01 MORBID (SEVERE) OBESITY DUE TO EXCESS CALORIES (HCC): ICD-10-CM

## 2025-06-30 NOTE — TELEPHONE ENCOUNTER
PCP: Yeni Lyons MD    Last appt: 3/19/2025   No future appointments.    Requested Prescriptions     Pending Prescriptions Disp Refills    metFORMIN (GLUCOPHAGE) 500 MG tablet [Pharmacy Med Name: METFORMIN TAB 500MG] 180 tablet 0     Sig: TAKE 1 TABLET TWICE DAILY  WITH MEALS

## 2025-07-10 DIAGNOSIS — E66.811 OBESITY (BMI 30.0-34.9): ICD-10-CM

## 2025-07-10 DIAGNOSIS — Z71.3 WEIGHT LOSS COUNSELING, ENCOUNTER FOR: ICD-10-CM

## 2025-07-10 RX ORDER — LIRAGLUTIDE 6 MG/ML
INJECTION SUBCUTANEOUS
Qty: 2 ADJUSTABLE DOSE PRE-FILLED PEN SYRINGE | Refills: 0 | Status: SHIPPED | OUTPATIENT
Start: 2025-07-10

## 2025-07-10 NOTE — TELEPHONE ENCOUNTER
PCP: Yeni Lyons MD    Last appt: 3/19/2025   No future appointments.    Requested Prescriptions     Pending Prescriptions Disp Refills    Liraglutide (VICTOZA) 18 MG/3ML SOPN SC injection [Pharmacy Med Name: LIRAGLUTIDE 2-YESSENIA 18 MG/3 ML]  1     Sig: INJECT 1.2 MG UNDER THE SKIN ONCE DAILY

## 2025-07-10 NOTE — TELEPHONE ENCOUNTER
Any patient who is on weight loss injection, needs strict 3 to 4 months follow-up to check on their weight as well as kidney and liver function.  She is due for her 4-month follow-up appointment.  Please let her know.  I have sent 1 month prescription but will need to be seen before any future refills.  Thanks

## 2025-07-31 DIAGNOSIS — E66.811 OBESITY (BMI 30.0-34.9): ICD-10-CM

## 2025-07-31 DIAGNOSIS — Z71.3 DIETARY COUNSELING AND SURVEILLANCE: ICD-10-CM

## 2025-07-31 DIAGNOSIS — Z71.3 WEIGHT LOSS COUNSELING, ENCOUNTER FOR: ICD-10-CM

## 2025-07-31 RX ORDER — TOPIRAMATE 100 MG/1
100 TABLET, FILM COATED ORAL DAILY
Qty: 90 TABLET | Refills: 1 | Status: SHIPPED | OUTPATIENT
Start: 2025-07-31

## 2025-07-31 NOTE — TELEPHONE ENCOUNTER
PCP: Yeni Lyons MD    Last appt: 3/19/2025   Future Appointments   Date Time Provider Department Center   8/7/2025  9:20 AM [facility]       Requested Prescriptions     Pending Prescriptions Disp Refills    topiramate (TOPAMAX) 100 MG tablet [Pharmacy Med Name: TOPIRAMATE 100 MG TABLET] 90 tablet 1     Sig: TAKE 1 TABLET BY MOUTH EVERY DAY         Prior labs and Blood pressures:  BP Readings from Last 3 Encounters:   03/19/25 125/77   02/06/25 121/79   09/19/24 118/82     Lab Results   Component Value Date/Time     03/19/2025 10:34 AM    K 3.4 03/19/2025 10:34 AM     03/19/2025 10:34 AM    CO2 26 03/19/2025 10:34 AM    BUN 11 03/19/2025 10:34 AM    GFRAA >60 05/10/2022 12:19 PM     No results found for: \"HBA1C\", \"[id number]\"  Lab Results   Component Value Date/Time    CHOL 235 03/19/2025 10:34 AM    HDL 99 03/19/2025 10:34 AM     03/19/2025 10:34 AM    .2 02/16/2024 09:53 AM    VLDL 10 03/19/2025 10:34 AM     No results found for: \"VITD3\"    Lab Results   Component Value Date/Time    TSH 2.31 03/19/2025 10:34 AM

## 2025-08-07 ENCOUNTER — OFFICE VISIT (OUTPATIENT)
Age: 50
End: 2025-08-07
Payer: COMMERCIAL

## 2025-08-07 VITALS
SYSTOLIC BLOOD PRESSURE: 120 MMHG | DIASTOLIC BLOOD PRESSURE: 78 MMHG | WEIGHT: 193 LBS | OXYGEN SATURATION: 98 % | HEART RATE: 82 BPM | RESPIRATION RATE: 16 BRPM | BODY MASS INDEX: 36.44 KG/M2 | HEIGHT: 61 IN | TEMPERATURE: 96.9 F

## 2025-08-07 DIAGNOSIS — E66.01 MORBID (SEVERE) OBESITY DUE TO EXCESS CALORIES (HCC): ICD-10-CM

## 2025-08-07 DIAGNOSIS — E66.811 OBESITY (BMI 30.0-34.9): ICD-10-CM

## 2025-08-07 DIAGNOSIS — Z71.3 WEIGHT LOSS COUNSELING, ENCOUNTER FOR: Primary | ICD-10-CM

## 2025-08-07 LAB
ALBUMIN SERPL-MCNC: 4.3 G/DL (ref 3.5–5.2)
ALBUMIN/GLOB SERPL: 1.4 (ref 1.1–2.2)
ALP SERPL-CCNC: 67 U/L (ref 35–104)
ALT SERPL-CCNC: 32 U/L (ref 10–35)
ANION GAP SERPL CALC-SCNC: 10 MMOL/L (ref 2–14)
AST SERPL-CCNC: 30 U/L (ref 10–35)
BILIRUB SERPL-MCNC: 0.6 MG/DL (ref 0–1.2)
BUN SERPL-MCNC: 14 MG/DL (ref 6–20)
BUN/CREAT SERPL: 19 (ref 12–20)
CALCIUM SERPL-MCNC: 10.2 MG/DL (ref 8.6–10)
CHLORIDE SERPL-SCNC: 101 MMOL/L (ref 98–107)
CO2 SERPL-SCNC: 27 MMOL/L (ref 20–29)
CREAT SERPL-MCNC: 0.72 MG/DL (ref 0.6–1)
GLOBULIN SER CALC-MCNC: 3.1 G/DL (ref 2–4)
GLUCOSE SERPL-MCNC: 77 MG/DL (ref 65–100)
POTASSIUM SERPL-SCNC: 4.4 MMOL/L (ref 3.5–5.1)
PROT SERPL-MCNC: 7.4 G/DL (ref 6.4–8.3)
SODIUM SERPL-SCNC: 139 MMOL/L (ref 136–145)

## 2025-08-07 PROCEDURE — 99213 OFFICE O/P EST LOW 20 MIN: CPT | Performed by: FAMILY MEDICINE

## 2025-08-07 RX ORDER — PEN NEEDLE, DIABETIC 32GX 5/32"
NEEDLE, DISPOSABLE MISCELLANEOUS
Qty: 100 EACH | Refills: 0 | Status: SHIPPED | OUTPATIENT
Start: 2025-08-07

## 2025-08-07 RX ORDER — LIRAGLUTIDE 6 MG/ML
1.2 INJECTION SUBCUTANEOUS DAILY
Qty: 9 ML | Refills: 2 | Status: SHIPPED | OUTPATIENT
Start: 2025-08-07

## 2025-08-07 ASSESSMENT — PATIENT HEALTH QUESTIONNAIRE - PHQ9
2. FEELING DOWN, DEPRESSED OR HOPELESS: NOT AT ALL
SUM OF ALL RESPONSES TO PHQ QUESTIONS 1-9: 0
1. LITTLE INTEREST OR PLEASURE IN DOING THINGS: NOT AT ALL

## 2025-08-07 ASSESSMENT — ENCOUNTER SYMPTOMS
DIARRHEA: 0
COUGH: 0
SHORTNESS OF BREATH: 0
CHEST TIGHTNESS: 0
CONSTIPATION: 0
SORE THROAT: 0
ABDOMINAL PAIN: 0
BACK PAIN: 0